# Patient Record
Sex: FEMALE | Race: WHITE | NOT HISPANIC OR LATINO | Employment: STUDENT | URBAN - METROPOLITAN AREA
[De-identification: names, ages, dates, MRNs, and addresses within clinical notes are randomized per-mention and may not be internally consistent; named-entity substitution may affect disease eponyms.]

---

## 2023-09-06 ENCOUNTER — TRANSFERRED RECORDS (OUTPATIENT)
Dept: HEALTH INFORMATION MANAGEMENT | Facility: CLINIC | Age: 28
End: 2023-09-06
Payer: COMMERCIAL

## 2023-10-04 ENCOUNTER — TRANSFERRED RECORDS (OUTPATIENT)
Dept: HEALTH INFORMATION MANAGEMENT | Facility: CLINIC | Age: 28
End: 2023-10-04
Payer: COMMERCIAL

## 2023-10-04 LAB
ALT SERPL-CCNC: 30 IU/L (ref 5–35)
AST SERPL-CCNC: 34 U/L (ref 5–34)
CREATININE (EXTERNAL): 0.58 MG/DL (ref 0.5–1.3)

## 2023-11-01 ENCOUNTER — OFFICE VISIT (OUTPATIENT)
Dept: OPHTHALMOLOGY | Facility: CLINIC | Age: 28
End: 2023-11-01
Attending: OPHTHALMOLOGY
Payer: COMMERCIAL

## 2023-11-01 DIAGNOSIS — H30.23 INTERMEDIATE UVEITIS OF BOTH EYES: Primary | ICD-10-CM

## 2023-11-01 DIAGNOSIS — H20.13 CHRONIC ANTERIOR UVEITIS OF BOTH EYES: ICD-10-CM

## 2023-11-01 DIAGNOSIS — Z86.69 HISTORY OF PAPILLEDEMA: ICD-10-CM

## 2023-11-01 DIAGNOSIS — H40.053 BILATERAL OCULAR HYPERTENSION: ICD-10-CM

## 2023-11-01 DIAGNOSIS — Z79.899 HIGH RISK MEDICATION USE: ICD-10-CM

## 2023-11-01 DIAGNOSIS — H35.352 CYSTOID MACULAR EDEMA OF LEFT EYE: ICD-10-CM

## 2023-11-01 PROCEDURE — 99207 OCT OPTIC NERVE RNFL SPECTRALIS OU (BOTH EYES): CPT | Mod: 26 | Performed by: OPHTHALMOLOGY

## 2023-11-01 PROCEDURE — 92133 CPTRZD OPH DX IMG PST SGM ON: CPT | Performed by: OPHTHALMOLOGY

## 2023-11-01 PROCEDURE — 99204 OFFICE O/P NEW MOD 45 MIN: CPT | Mod: GC | Performed by: OPHTHALMOLOGY

## 2023-11-01 PROCEDURE — 99214 OFFICE O/P EST MOD 30 MIN: CPT | Mod: 25 | Performed by: OPHTHALMOLOGY

## 2023-11-01 PROCEDURE — 92134 CPTRZ OPH DX IMG PST SGM RTA: CPT | Performed by: OPHTHALMOLOGY

## 2023-11-01 PROCEDURE — 92235 FLUORESCEIN ANGRPH MLTIFRAME: CPT | Performed by: OPHTHALMOLOGY

## 2023-11-01 RX ORDER — NORETHINDRONE ACETATE AND ETHINYL ESTRADIOL 1MG-20(21)
1 KIT ORAL DAILY
COMMUNITY
Start: 2023-08-24

## 2023-11-01 RX ORDER — PREDNISONE 2.5 MG/1
TABLET ORAL
Qty: 200 TABLET | Refills: 1 | Status: SHIPPED | OUTPATIENT
Start: 2023-11-01 | End: 2023-12-13

## 2023-11-01 RX ORDER — PREDNISOLONE ACETATE 10 MG/ML
1 SUSPENSION/ DROPS OPHTHALMIC
COMMUNITY
Start: 2023-08-24 | End: 2023-12-13

## 2023-11-01 RX ORDER — FOLIC ACID 1 MG/1
2 TABLET ORAL
COMMUNITY
Start: 2023-08-02

## 2023-11-01 ASSESSMENT — EXTERNAL EXAM - LEFT EYE: OS_EXAM: NORMAL

## 2023-11-01 ASSESSMENT — VISUAL ACUITY
OD_CC: 20/25
OD_CC+: -1
METHOD: SNELLEN - LINEAR
CORRECTION_TYPE: GLASSES
OS_CC: 20/25

## 2023-11-01 ASSESSMENT — CONF VISUAL FIELD
OS_SUPERIOR_TEMPORAL_RESTRICTION: 0
OS_NORMAL: 1
OS_INFERIOR_TEMPORAL_RESTRICTION: 0
OS_INFERIOR_NASAL_RESTRICTION: 0
OD_SUPERIOR_TEMPORAL_RESTRICTION: 0
METHOD: COUNTING FINGERS
OD_NORMAL: 1
OD_INFERIOR_TEMPORAL_RESTRICTION: 0
OD_INFERIOR_NASAL_RESTRICTION: 0
OS_SUPERIOR_NASAL_RESTRICTION: 0
OD_SUPERIOR_NASAL_RESTRICTION: 0

## 2023-11-01 ASSESSMENT — EXTERNAL EXAM - RIGHT EYE: OD_EXAM: NORMAL

## 2023-11-01 ASSESSMENT — CUP TO DISC RATIO
OD_RATIO: 0.2
OS_RATIO: 0.25

## 2023-11-01 ASSESSMENT — SLIT LAMP EXAM - LIDS
COMMENTS: NO LASH LOSS
COMMENTS: NO LASH LOSS

## 2023-11-01 ASSESSMENT — TONOMETRY
IOP_METHOD: TONOPEN
OD_IOP_MMHG: 18
OS_IOP_MMHG: 15

## 2023-11-01 NOTE — PROGRESS NOTES
Chief Complaint/Presenting Concern: Uveitis evaluation    History of Present Illness:   Glory Oswald is a 27 year old patient who presents to transfer care for her intermediate uveitis.  This was originally diagnosed around 2021 and treated with oral steroid, which is currently being tapered but still used.  Oral methotrexate was added in 2023 and Ms. Oswald is interested in assessing the current status and future plans for her treatment.    Ms. Oswald was previously seen by Dr. John at [facility] and more recently Dr. Russ Gonzalez.  A flare of the anterior uveitis occurred in August 2023 requiring a modification of oral prednisone (up to 15 mg daily) and the addition of steroid eyedrops to the right eye, which have since been tapered.  No steroid eye injections have been given.    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 Previously evaluated by Dr. Konstantin pennington in 2014. Sees Neurosurgery only as needed given long-term stability  History of ocular hypertension both eyes without the need for regular use of eye pressure lowering drops  Cystoid macular edema left eye    Relevant Past Medical/Family/Social History:  She follows with Dr. Doyle and has been on methotrexate since January 2023, currently 7 pills (17.5 mg weekly) since February 2023. As above, oral prednisone has been used since 2021 and has not been discontinued for very long since that time.   History of pilocytic astrocytoma as above, s/p resection  3. No history of diabetes or blood pressure issues.  4.  Uses oral contraceptives    She reports family history of diabetes, but denies family history of autoimmune diseases.     She lives in [place], [place] and works in [employer] at [employer].   Here with her .    Relevant Review of Systems:She denies rashes, cough, difficulty breathing. No  headaches. Rarely ringing in the ears.    Laboratory Testing  Abnormal: Borderline RF, Elevated CRP;  8/24/23- Elevated AST ALT  Normal/negative: DMARD panel, CBC, lyme, ACE, hepatitis, CCP, HLA B27, ESR,   2/2021- Quant TB, Treponema, ACE     Current eye related medications: Oral Methotrexate 17.5 mg (7 tablets) weekly (on Saturdays), Folic acid 1mg 2 tablets daily, Prednisone 10 mg 1 tablet daily since August 2023,, prednisolone acetate 1 drop 1x daily right eye (down from 2x daily on 10/25/23), Artificial Tears as needed each eye     Retina/Uveitis Imaging:  OCT Spectralis Macula November 1, 2023  right eye: Vitreous opacities, Normal contour with no fluid or disruption of IS/OS  left eye:Vit debris noted, Normal contour with no fluid or disruption of IS/OS    RNFL November 1, 2023  Right eye: Avg thickness 125 microns, mild thickening, no thinning  Left eye:  Avg thickness 116 microns, minimal thickening, no thinning    Optos FA November 1, 2023  right eye: Transit normal; small vessel leakage mostly nasal and staining superotemporal, minimal disc leakage, no foveal leakage  left eye:  Late disc leakage. There is small vessel leakage mostly inferiorly and staining temporal and faint staining in the macula and Inferotemporal    Assessment:    1. Intermediate uveitis of both eyes  There is moderate vitreous inflammation right eye and mild left eye, this corresponds to degree of inflammation seen on angiogram     2. Chronic anterior uveitis of both eyes  Active in the right eye today.Inactive in the left eye today.     3. Bilateral ocular hypertension  Pressures are normal in both eyes today    4. Cystoid macular edema of left eye  None by OCT in either eye    5. History of papilledema  No recurrence    6. High risk medication use  Methotrexate 17.5 mg each week and prednisone 10 mg daily    Plan/Recommendations:  Discussed findings with patient.  There is still active inflammation clinically in the right eye and  by angiography in both eyes.  There is no sight threatening cystoid macular edema in either eye.  We discussed the long-term use of oral prednisone and the current dose of methotrexate which is well-tolerated.  As below ,we recommend increasing the dose of methotrexate while we attempt a prednisone taper.  We will also modify steroid drop use in the right eye to help with the transition  Eye pressure is within acceptable range in both eyes today.  Optic nerves and scans are healthy, so no eye pressure lowering drops are needed  Recommend additional diagnostic testing: None at this time  For the methotrexate, if okay with Dr. Doyle, let's increase the dose to 8 pills (20 mg) starting this week and then continuing every week.  This would be a good interval increase given the inflammatory features.  If there is a need for additional inflammation control as prednisone is tapered, we may ask Dr. Doyle about the addition of Humira  Continue folic acid 2 mg daily  For the steroid drop, increase to 2x/day in the right eye and no steroid drops needed left eye  For the prednisone pills, continue taking 10 mg each AM until Sunday, 11/26/23. Starting on 11/27/23, reduce to 7.5 mg each AM until next visit    RTC Dec 11 week tonopen, AC check, dilate each eye, OCT macula (ordered)    Zackary Ventura MD MPH  Vitreoretinal Fellow PGY-6  [facility]     Attending Physician Attestation:  Complete documentation of historical and exam elements from today's encounter can be found in the full encounter summary report (not reduplicated in this progress note). I reviewed the chief complaint(s) and history of present illness, and  confirmed and edited as necessary the review of systems, past medical/surgical history, family history, social history, and examination findings as documented by others and the treating Resident or Fellow Physician.    I examined the patient myself, discussed the findings, reviewed all ancillary testing  data and modified these results and reports along with the assessment and plan with the Treating Resident or Fellow Physician. I agree with the note as detailed above.   Ricky Dior M.D.  Uveitis and Medical Retina  November 1, 2023

## 2023-11-01 NOTE — NURSING NOTE
Chief Complaints and History of Present Illnesses   Patient presents with    Uveitis Evaluation     Chief Complaint(s) and History of Present Illness(es)       Uveitis Evaluation              Laterality: both eyes    Onset: gradual    Onset: months ago    Quality: States the va is blurry     Severity: moderate    Frequency: intermittently    Associated symptoms: photophobia, flashes (rarely) and floaters    Treatments tried: eye drops    Pain scale: 0/10              Comments    Here for intermediate uveitis   States the last flare was Aug in each eye   Prednisolone every day right eye   Prednisone 10 mg  Methotrexate  Folic acid  Yadira Barber COT 2:06 PM November 1, 2023

## 2023-11-01 NOTE — PATIENT INSTRUCTIONS
For the methotrexate, if okay with Dr. Doyle, let's increase the dose to 8 pills (20 mg) starting this week and then continuing every week   Continue folic acid 2 mg daily  For the steroid drop, increase to 2x/day in the right eye and none needed left eye  For the prednisone pills, continue taking 10 mg each AM until Sunday, 11/26/23. Starting on 11/27/23, reduce to 7.5 mg each AM Until next visit

## 2023-11-01 NOTE — LETTER
11/1/2023       RE: Glory Oswald  [address] [id number]     Dear Colleague,    Thank you for referring your patient, Glory Oswald, to the [facility] [place] at [facility]. Please see a copy of my visit note below.    Chief Complaint/Presenting Concern: Uveitis evaluation.    History of Present Illness:   Glory Oswald is a 27 year old patient who presents to transfer care for her intermediate uveitis.  This was originally diagnosed around 2021 and treated with oral steroid, which is currently being tapered but still used.  Oral methotrexate was added in 2023 and Ms. Oswald is interested in assessing the current status and future plans for her treatment.    Ms. Oswald was previously seen by Dr. John at [facility] and more recently Dr. Russ Gonzalez.  A flare of the anterior uveitis occurred in August 2023 requiring a modification of oral prednisone (up to 15 mg daily) and the addition of steroid eyedrops to the right eye, which have since been tapered.  No steroid eye injections have been given.    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 Previously evaluated by Dr. Pryor last in 2014. Sees Neurosurgery only as needed given long-term stability.  History of ocular hypertension both eyes without the need for regular use of eye pressure lowering drops.  Cystoid macular edema left eye.    Relevant Past Medical/Family/Social History:  She follows with Dr. Doyle and has been on methotrexate since January 2023, currently 7 pills (17.5 mg weekly) since February 2023. As above, oral prednisone has been used since 2021 and has not been discontinued for very long since that time.   History of pilocytic astrocytoma as above, s/p resection.  3. No history of diabetes or blood  Please inform patient Fibroscan shows grade 3 fatty liver. She does not have fibrosis or cirrhosis which is reassuring. Recommend to continue with weight loss and exercise.  pressure issues.  4.  Uses oral contraceptives.    She reports family history of diabetes, but denies family history of autoimmune diseases.     She lives in [place], [place] and works in [employer] at [employer].   Here with her .    Relevant Review of Systems:She denies rashes, cough, difficulty breathing. No headaches. Rarely ringing in the ears.    Laboratory Testing  Abnormal: Borderline RF, Elevated CRP;  8/24/23- Elevated AST ALT  Normal/negative: DMARD panel, CBC, lyme, ACE, hepatitis, CCP, HLA B27, ESR,   2/2021- Quant TB, Treponema, ACE     Current eye related medications: Oral Methotrexate 17.5 mg (7 tablets) weekly (on Saturdays), Folic acid 1mg 2 tablets daily, Prednisone 10 mg 1 tablet daily since August 2023,, prednisolone acetate 1 drop 1x daily right eye (down from 2x daily on 10/25/23), Artificial Tears as needed each eye     Retina/Uveitis Imaging:  OCT Spectralis Macula November 1, 2023  right eye: Vitreous opacities, Normal contour with no fluid or disruption of IS/OS  left eye:Vit debris noted, Normal contour with no fluid or disruption of IS/OS    RNFL November 1, 2023  Right eye: Avg thickness 125 microns, mild thickening, no thinning  Left eye:  Avg thickness 116 microns, minimal thickening, no thinning    Optos FA November 1, 2023  right eye: Transit normal; small vessel leakage mostly nasal and staining superotemporal, minimal disc leakage, no foveal leakage  left eye:  Late disc leakage. There is small vessel leakage mostly inferiorly and staining temporal and faint staining in the macula and Inferotemporal    Assessment:    1. Intermediate uveitis of both eyes  There is moderate vitreous inflammation right eye and mild left eye, this corresponds to degree of inflammation seen on angiogram.     2. Chronic anterior uveitis of both eyes  Active in the right eye today.Inactive in the left eye today.     3. Bilateral ocular hypertension  Pressures are normal in both eyes  today.    4. Cystoid macular edema of left eye  None by OCT in either eye.    5. History of papilledema  No recurrence.    6. High risk medication use  Methotrexate 17.5 mg each week and prednisone 10 mg daily.    Plan/Recommendations:  Discussed findings with patient.  There is still active inflammation clinically in the right eye and by angiography in both eyes.  There is no sight threatening cystoid macular edema in either eye.  We discussed the long-term use of oral prednisone and the current dose of methotrexate which is well-tolerated.  As below ,we recommend increasing the dose of methotrexate while we attempt a prednisone taper.  We will also modify steroid drop use in the right eye to help with the transition.  Eye pressure is within acceptable range in both eyes today.  Optic nerves and scans are healthy, so no eye pressure lowering drops are needed.  Recommend additional diagnostic testing: None at this time.  For the methotrexate, if okay with Dr. Doyle, let's increase the dose to 8 pills (20 mg) starting this week and then continuing every week.  This would be a good interval increase given the inflammatory features.  If there is a need for additional inflammation control as prednisone is tapered, we may ask Dr. Doyle about the addition of Humira.  Continue folic acid 2 mg daily.  For the steroid drop, increase to 2x/day in the right eye and no steroid drops needed left eye.  For the prednisone pills, continue taking 10 mg each AM until Sunday, 11/26/23. Starting on 11/27/23, reduce to 7.5 mg each AM until next visit.    RTC Dec 11 week tonopen, AC check, dilate each eye, OCT macula (ordered)    Zackary Ventura MD MPH  Vitreoretinal Fellow PGY-6  [facility]     Attending Physician Attestation:  Complete documentation of historical and exam elements from today's encounter can be found in the full encounter summary report (not reduplicated in this progress note). I reviewed the chief  complaint(s) and history of present illness, and  confirmed and edited as necessary the review of systems, past medical/surgical history, family history, social history, and examination findings as documented by others and the treating Resident or Fellow Physician.    I examined the patient myself, discussed the findings, reviewed all ancillary testing data (if any completed) and modified these results and reports (if any interpreted) along with the assessment and plan with the Treating Resident or Fellow Physician. I agree with the note as detailed above.   Ricky Dior M.D.  Uveitis and Medical Retina  November 1, 2023      Again, thank you for allowing me to participate in the care of your patient.      Sincerely,    Ricky Dior MD  [facility] Dept of Ophthalmology  Uveitis and Medical Retina

## 2023-12-13 ENCOUNTER — OFFICE VISIT (OUTPATIENT)
Dept: OPHTHALMOLOGY | Facility: CLINIC | Age: 28
End: 2023-12-13
Attending: OPHTHALMOLOGY
Payer: COMMERCIAL

## 2023-12-13 DIAGNOSIS — H40.053 BILATERAL OCULAR HYPERTENSION: ICD-10-CM

## 2023-12-13 DIAGNOSIS — Z79.899 HIGH RISK MEDICATION USE: ICD-10-CM

## 2023-12-13 DIAGNOSIS — H30.23 INTERMEDIATE UVEITIS OF BOTH EYES: Primary | ICD-10-CM

## 2023-12-13 DIAGNOSIS — H35.352 CYSTOID MACULAR EDEMA OF LEFT EYE: ICD-10-CM

## 2023-12-13 DIAGNOSIS — H20.13 CHRONIC ANTERIOR UVEITIS OF BOTH EYES: ICD-10-CM

## 2023-12-13 DIAGNOSIS — Z86.69 HISTORY OF PAPILLEDEMA: ICD-10-CM

## 2023-12-13 PROCEDURE — 99214 OFFICE O/P EST MOD 30 MIN: CPT | Mod: GC | Performed by: OPHTHALMOLOGY

## 2023-12-13 PROCEDURE — 92134 CPTRZ OPH DX IMG PST SGM RTA: CPT | Performed by: OPHTHALMOLOGY

## 2023-12-13 PROCEDURE — 99214 OFFICE O/P EST MOD 30 MIN: CPT | Performed by: OPHTHALMOLOGY

## 2023-12-13 RX ORDER — PREDNISOLONE ACETATE 10 MG/ML
1 SUSPENSION/ DROPS OPHTHALMIC 2 TIMES DAILY
Qty: 5 ML | Refills: 4 | Status: SHIPPED | OUTPATIENT
Start: 2023-12-13 | End: 2024-03-04

## 2023-12-13 RX ORDER — PREDNISONE 2.5 MG/1
TABLET ORAL
Qty: 100 TABLET | Refills: 1 | Status: SHIPPED | OUTPATIENT
Start: 2023-12-13 | End: 2024-02-23

## 2023-12-13 ASSESSMENT — VISUAL ACUITY
METHOD: SNELLEN - LINEAR
OD_SC: 20/25
OS_SC: 20/25
OS_SC+: +2

## 2023-12-13 ASSESSMENT — CUP TO DISC RATIO
OD_RATIO: 0.2
OS_RATIO: 0.25

## 2023-12-13 ASSESSMENT — TONOMETRY
OD_IOP_MMHG: 19
OS_IOP_MMHG: 16
IOP_METHOD: TONOPEN

## 2023-12-13 ASSESSMENT — CONF VISUAL FIELD
METHOD: COUNTING FINGERS
OS_SUPERIOR_TEMPORAL_RESTRICTION: 0
OD_SUPERIOR_NASAL_RESTRICTION: 0
OS_SUPERIOR_NASAL_RESTRICTION: 0
OS_INFERIOR_NASAL_RESTRICTION: 0
OD_SUPERIOR_TEMPORAL_RESTRICTION: 0
OD_INFERIOR_TEMPORAL_RESTRICTION: 0
OD_INFERIOR_NASAL_RESTRICTION: 0
OS_INFERIOR_TEMPORAL_RESTRICTION: 0
OS_NORMAL: 1
OD_NORMAL: 1

## 2023-12-13 ASSESSMENT — EXTERNAL EXAM - LEFT EYE: OS_EXAM: NORMAL

## 2023-12-13 ASSESSMENT — EXTERNAL EXAM - RIGHT EYE: OD_EXAM: NORMAL

## 2023-12-13 NOTE — PROGRESS NOTES
"Chief Complaint/Presenting Concern:  Uveitis follow up    Interval History of Present Ocular Illness:  Glory Oswald is a 27 year old patient who returns for follow up of her intermediate uveitis. We last met each other on 11/01/2023 at which time we decided to increase methotrexate to 8 pills (20 mg) and prednisolone to 2x daily. and reduce prednisone to 7.5 mg daily.     Ms. Oswald reports that she has had some \"brain fog\" after increasing her dose of methotrexate. She continues to experience photophobia and blurry vision each eye but right eye > left eye. She was able to increase methotrexate and lower prednisone.    Interval Updates to Medical/Family/Social History:   No health changes. Still having prednisone side effects such as trouble sleeping and going the bathroom frequently    Relevant Review of Systems Updates:   Some \"brain fog\" and fatigue/nausea on day of and day after methotrexate  Has experienced headaches randomly, some ringing in her ears in the left ear. Has blue filter glasses that help. No rashes, no joint pain, no fever or chills.    Laboratory Testing: No new labs     Current eye related medications: Oral Methotrexate 20.0 mg (8 tablets) weekly (on Saturdays), Folic acid 1mg 2 tablets daily, Prednisone 7.5 mg daily since November 2023, prednisolone acetate 1 drop 2x daily right eye (11/01/23), Artificial Tears as needed each eye      Retina/Uveitis Imaging:   OCT Spectralis Macula December 13, 2023  right eye: Decrease in vitreous opacities.Normal foveal contour, no fluid  left eye: Normal foveal contour, trace ERM.no fluid    Assessment:     1. Intermediate uveitis of both eyes  Right eye:Persistent central mobile haze, no worse    Left eye: Trace haze but stable    2. Chronic anterior uveitis of both eyes  Right eye: Still active    Left eye: No inflammation    3. Bilateral ocular hypertension  IOP within normal limits today    4. Cystoid macular edema of left eye  No edema on OCT " today    5. History of papilledema  No signs on papilledema on exam today    6. High risk medication use  Methotrexate 20 mg each week and prednisone 7.5 mg daily     Plan/Recommendations:    Discussed findings with patient and her . There is still active inflammation of the right eye but there is no macular edema in either eye. This is encouraging as we are on more methotrexate and lower prednisone. WE discussed other options which are likely to help and have noted below. Given it has been 3 years on prednisone, we would like to move towards getting down on this dose as we ask Dr. Doyle about other options  Eye pressure is 19/16. This can be monitored  We discussed steroid injections for the eyes which work well for inflammation but can raise eye pressure and lead to cataracts. We elected to consider this for later if necessary, reasonable to hold off for now as no macular edema  Recommend additional testing: No additional testing at this time   Continue Oral Methotrexate 20 mg (8 tablets) weekly (on Saturdays) without dose adjustment at this time  You can try to take Folic acid 2 mg daily. You can try one extra dose (3 mg total) on methotrexate day and day after to see if his helps  Continue Prednisone 7.5 mg daily  For the steroid drop, continue 2xday right eye only  We will ask Dr. Doyle about adding Humira. There is no urgency, but this is likely to help inflammation as we try to taper prednisone.     RTC:    Dr. Doyle in a few weeks in January 2.  Barby mid-late January tonopen, AC check, then dilate each eye, and OCT (ordered)    Mark Foster MD  PGY-5 Vitreo-retina surgery Fellow  Department of Ophthalmology   [facility]    Attending Physician Attestation:  Complete documentation of historical and exam elements from today's encounter can be found in the full encounter summary report (not reduplicated in this progress note). I reviewed the chief complaint(s) and history of  present illness, and  confirmed and edited as necessary the review of systems, past medical/surgical history, family history, social history, and examination findings as documented by others and the treating Resident or Fellow Physician.    I examined the patient myself, discussed the findings, reviewed all ancillary testing data and modified these results and reports along with the assessment and plan with the Treating Resident or Fellow Physician. I agree with the note as detailed above.   Ricky Dior M.D.  Uveitis and Medical Retina  December 13, 2023

## 2023-12-13 NOTE — LETTER
"12/13/2023       RE: Glory Oswald  [address] [address]     Dear Colleague,    Thank you for referring your patient, Glory Oswald, to the [facility] [place] at [facility]. Please see a copy of my visit note below.    Chief Complaint/Presenting Concern:  Uveitis follow up.    Interval History of Present Ocular Illness:  Glory Oswald is a 27 year old patient who returns for follow up of her intermediate uveitis. We last met each other on 11/01/2023 at which time we decided to increase methotrexate to 8 pills (20 mg) and prednisolone to 2x daily. and reduce prednisone to 7.5 mg daily.     Ms. Oswald reports that she has had some \"brain fog\" after increasing her dose of methotrexate. She continues to experience photophobia and blurry vision each eye but right eye > left eye. She was able to increase methotrexate and lower prednisone.    Interval Updates to Medical/Family/Social History:   No health changes. Still having prednisone side effects such as trouble sleeping and going the bathroom frequently.    Relevant Review of Systems Updates:   Some \"brain fog\" and fatigue/nausea on day of and day after methotrexate  Has experienced headaches randomly, some ringing in her ears in the left ear. Has blue filter glasses that help. No rashes, no joint pain, no fever or chills.    Laboratory Testing: No new labs     Current eye related medications: Oral Methotrexate 20.0 mg (8 tablets) weekly (on Saturdays), Folic acid 1mg 2 tablets daily, Prednisone 7.5 mg daily since November 2023, prednisolone acetate 1 drop 2x daily right eye (11/01/23), Artificial Tears as needed each eye      Retina/Uveitis Imaging:   OCT Spectralis Macula December 13, 2023  right eye: Decrease in vitreous opacities.Normal foveal contour, no fluid  left eye: Normal foveal contour, trace ERM.no fluid    Assessment:     1. Intermediate uveitis of both " eyes  Right eye:Persistent central mobile haze, no worse.    Left eye: Trace haze but stable.    2. Chronic anterior uveitis of both eyes  Right eye: Still active.    Left eye: No inflammation.    3. Bilateral ocular hypertension  IOP within normal limits today.    4. Cystoid macular edema of left eye  No edema on OCT today.    5. History of papilledema  No signs on papilledema on exam today.    6. High risk medication use  Methotrexate 20 mg each week and prednisone 7.5 mg daily.     Plan/Recommendations:    Discussed findings with patient and her . There is still active inflammation of the right eye but there is no macular edema in either eye. This is encouraging as we are on more methotrexate and lower prednisone. WE discussed other options which are likely to help and have noted below. Given it has been 3 years on prednisone, we would like to move towards getting down on this dose as we ask Dr. Doyle about other options.  Eye pressure is 19/16. This can be monitored.  We discussed steroid injections for the eyes which work well for inflammation but can raise eye pressure and lead to cataracts. We elected to consider this for later if necessary, reasonable to hold off for now as no macular edema.  Recommend additional testing: No additional testing at this time.   Continue Oral Methotrexate 20 mg (8 tablets) weekly (on Saturdays) without dose adjustment at this time.  You can try to take Folic acid 2 mg daily. You can try one extra dose (3 mg total) on methotrexate day and day after to see if his helps.  Continue Prednisone 7.5 mg daily.  For the steroid drop, continue 2xday right eye only.  We will ask Dr. Doyle about adding Humira. There is no urgency, but this is likely to help inflammation as we try to taper prednisone.     RTC:    Dr. Doyle in a few weeks in January  2.  Barby mid-late January tonopen, AC check, then dilate right eye, and OCT (ordered)    Mark Foster MD  PGY-5 Vitreo-retina  surgery Fellow  Department of Ophthalmology   [facility]    Attending Physician Attestation:  Complete documentation of historical and exam elements from today's encounter can be found in the full encounter summary report (not reduplicated in this progress note). I reviewed the chief complaint(s) and history of present illness, and  confirmed and edited as necessary the review of systems, past medical/surgical history, family history, social history, and examination findings as documented by others and the treating Resident or Fellow Physician.    I examined the patient myself, discussed the findings, reviewed all ancillary testing data and modified these results and reports along with the assessment and plan with the Treating Resident or Fellow Physician. I agree with the note as detailed above.   Ricky Dior M.D.  Uveitis and Medical Retina  December 13, 2023      Again, thank you for allowing me to participate in the care of your patient.      Sincerely,    Ricky Dior MD  [facility] Dept of Ophthalmology  Uveitis and Medical Retina

## 2023-12-13 NOTE — PATIENT INSTRUCTIONS
Continue Oral Methotrexate 20 mg (8 tablets) weekly (on Saturdays) without dose adjustment at this time  You can try to take Folic acid 2 mg daily. You can try one extra dose (3 mg total) on methotrexate day and day after to see if his helps  Continue Prednisone 7.5 mg daily  For the steroid drop, continue 2xday right eye only  We will ask Dr. Doyle about adding Humira. There is no urgency, but this is likely to help inflammation as we try to taper prednisone.

## 2023-12-13 NOTE — NURSING NOTE
Chief Complaints and History of Present Illnesses   Patient presents with    Uveitis Follow-Up     6 week follow up      Chief Complaint(s) and History of Present Illness(es)       Uveitis Follow-Up              Comments: 6 week follow up               Comments    Pt states vision is the same as last visit, very blurry in RE. Dryness and soreness in each eye, relief with artificial tears.    BREANNE Oneal December 13, 2023 7:16 AM

## 2024-01-04 ENCOUNTER — TRANSFERRED RECORDS (OUTPATIENT)
Dept: HEALTH INFORMATION MANAGEMENT | Facility: CLINIC | Age: 29
End: 2024-01-04
Payer: COMMERCIAL

## 2024-01-04 LAB
ALT SERPL-CCNC: 22 IU/L (ref 5–35)
AST SERPL-CCNC: 34 U/L (ref 5–34)
CREATININE (EXTERNAL): 0.58 MG/DL (ref 0.5–1.3)

## 2024-01-19 ENCOUNTER — TELEPHONE (OUTPATIENT)
Dept: OPHTHALMOLOGY | Facility: CLINIC | Age: 29
End: 2024-01-19
Payer: COMMERCIAL

## 2024-01-19 NOTE — TELEPHONE ENCOUNTER
[facility] Call Center    Phone Message    May a detailed message be left on voicemail: Yes     Reason for Call: Symptoms or Concerns     If patient has red-flag symptoms, warm transfer to triage line    Current symptom or concern: Left eye sensitivity and redness.  Patient asking if she can start eye drops on that eye prior to appointment on Wednesday 1/24.     Symptoms have been present for:  2 day(s)    Has patient previously been seen for this? Yes    By Barby: 12/13/2023      Are there any new or worsening symptoms? Yes: Left eye flare up.     Action Taken: Message routed to:  [facility] ([facility] Eye     Travel Screening: Not Applicable

## 2024-01-24 ENCOUNTER — OFFICE VISIT (OUTPATIENT)
Dept: OPHTHALMOLOGY | Facility: CLINIC | Age: 29
End: 2024-01-24
Attending: OPHTHALMOLOGY
Payer: COMMERCIAL

## 2024-01-24 DIAGNOSIS — H20.13 CHRONIC ANTERIOR UVEITIS OF BOTH EYES: Primary | ICD-10-CM

## 2024-01-24 DIAGNOSIS — H40.053 BILATERAL OCULAR HYPERTENSION: ICD-10-CM

## 2024-01-24 DIAGNOSIS — H30.23 INTERMEDIATE UVEITIS OF BOTH EYES: ICD-10-CM

## 2024-01-24 DIAGNOSIS — H35.352 CYSTOID MACULAR EDEMA OF LEFT EYE: ICD-10-CM

## 2024-01-24 DIAGNOSIS — Z79.899 HIGH RISK MEDICATION USE: ICD-10-CM

## 2024-01-24 PROCEDURE — 92134 CPTRZ OPH DX IMG PST SGM RTA: CPT | Performed by: OPHTHALMOLOGY

## 2024-01-24 PROCEDURE — 99213 OFFICE O/P EST LOW 20 MIN: CPT | Performed by: OPHTHALMOLOGY

## 2024-01-24 PROCEDURE — 99214 OFFICE O/P EST MOD 30 MIN: CPT | Mod: GC | Performed by: OPHTHALMOLOGY

## 2024-01-24 RX ORDER — TIMOLOL MALEATE 5 MG/ML
1 SOLUTION/ DROPS OPHTHALMIC EVERY MORNING
Qty: 10 ML | Refills: 5 | Status: SHIPPED | OUTPATIENT
Start: 2024-01-24 | End: 2024-05-28

## 2024-01-24 ASSESSMENT — SLIT LAMP EXAM - LIDS
COMMENTS: NORMAL
COMMENTS: NORMAL

## 2024-01-24 ASSESSMENT — CONF VISUAL FIELD
OD_SUPERIOR_TEMPORAL_RESTRICTION: 0
OD_INFERIOR_TEMPORAL_RESTRICTION: 0
OS_SUPERIOR_TEMPORAL_RESTRICTION: 0
OD_INFERIOR_NASAL_RESTRICTION: 0
METHOD: COUNTING FINGERS
OD_SUPERIOR_NASAL_RESTRICTION: 0
OD_NORMAL: 1
OS_INFERIOR_NASAL_RESTRICTION: 0
OS_INFERIOR_TEMPORAL_RESTRICTION: 0
OS_SUPERIOR_NASAL_RESTRICTION: 0
OS_NORMAL: 1

## 2024-01-24 ASSESSMENT — TONOMETRY
OS_IOP_MMHG: 18
OD_IOP_MMHG: 24
IOP_METHOD: TONOPEN
IOP_METHOD: APPLANATION
OD_IOP_MMHG: 24

## 2024-01-24 ASSESSMENT — CUP TO DISC RATIO
OD_RATIO: 0.2
OS_RATIO: 0.25

## 2024-01-24 ASSESSMENT — EXTERNAL EXAM - RIGHT EYE: OD_EXAM: NORMAL

## 2024-01-24 ASSESSMENT — EXTERNAL EXAM - LEFT EYE: OS_EXAM: NORMAL

## 2024-01-24 ASSESSMENT — VISUAL ACUITY
OS_SC: 20/30
METHOD: SNELLEN - LINEAR
OD_SC: 20/20
OD_SC+: -2

## 2024-01-24 NOTE — PROGRESS NOTES
Chief Complaint/Presenting Concern: Uveitis follow-up    Interval History of Present Ocular Illness:  Glory Oswald is a 28 year old patient who returns for follow up of her intermediate uveitis of both eyes.  We last saw each other on December 13, 2023 after the dose of methotrexate has been increased to 20 mg weekly.  Inflammation in the right eye was still active we considered more time on methotrexate 20 mg weekly and prednisone 7.5 mg daily while in rheumatology to consider other Humira.    A few days ago Glory messaged about some symptoms in her left eye and we discussed starting drops in the eye. Around this time, Glory also had some light sensitivity right eye. This seemed to get worse after COVID booster. She used the drops 4x/day in each eye and things have improved. Floaters still there right eye and no major change left eye with floaters but still not quite right.     Interval Updates to Medical/Family/Social History:    Dr. Doyle felt the dose of methotrexate was appropriate but is open to the idea of adding Humira if we feel necessary  Glory got a COVID booster last week    Relevant Review of Systems Updates:  No other issues from vaccine. Otherwise feeing well.    Laboratory Testing - 1/4/24 with Rheumatology-scanned in  Abnormal: CRP 3.82 (H), ESR 30 (H)  Normal/negative: AST, ALT, Cr, GFR, CBC     Current eye related medications: Prednisone 7.5 mg daily, Oral methotrexate 20 mg (8 tablets weekly, folic acid 2 mg daily and 3 mg day of and day after methotrexate, prednisone 4x/day BOTH EYES    Retina/Uveitis Imaging:   OCT Spectralis Macula January 24, 2024  right eye: mild increase in vitreous opacitiesnormal foveal contour, no fluid. No NFL Thickening  left eye: No vitreous opacities, Normal foveal contour with trace ERM, no IRF/SRF. Mild NFL thickening    Assessment:     1. Chronic anterior uveitis of both eyes  Slight increase right eye and definite increase left eye     2.  Intermediate uveitis of both eyes  Right eye: Still some central haze, mild increase in vitreous opacities on OCT    Left eye: Stable trace haze    3. Bilateral ocular hypertension  IOP 24/18 today.    4. Cystoid macular edema of left eye  No macular edema on OCT today    5. High risk medication use  Methotrexate 20 mg each week and prednisone 7.5 mg daily     Plan/Recommendations:    Discussed findings with patient. There is slightly increased inflammation in both eyes today compared to her last visit, more notably in the left eye. Glory was able to increase her prednisolone drops to 4x daily in both eyes which has helped her symptoms. We discussed modifications of prednisone vs steroid injection in the short term as well as long term options. We will modify oral prednisone for now and ask Dr. Doyle about adding Humira   Eye pressure is 24/18. IOP slightly elevated in the right eye today, this is likely a steroid response. We can continue to monitor but will watch closely.   Continue Oral Methotrexate 20 mg (8 tablets) weekly (on Saturdays). Given inflammation is still active (and the left eye has a flare as well), we would kindly ask Dr. Doyle about adding Humira 40 mg every 2 weeks as this is is likely to help and may ultimately help reduce methotrexate dose  Continue  Folic acid 2 mg daily and 3 mg on methotrexate day and day after.  For the prednisone, let's up to to 10 mg daily until next visit.   For the steroid drop (Prednisolone): Continue 4x/day both eyes for 10 more days (Until Feb 3). Then starting Feb 4, use 3x/day in each eye for two weeks (until Feb 18). Then starting Feb 19, reduce to 2x/day until next visit  Add Timolol each morning in each eye for eye pressure lowering    RTC 1st week March tonopen, OCT, then AC check and dilate    Kennedi West MD  Resident Physician, PGY-3  Department of Ophthalmology    Attending Physician Attestation:  Complete documentation of historical and exam elements  from today's encounter can be found in the full encounter summary report (not reduplicated in this progress note). I reviewed the chief complaint(s) and history of present illness, and  confirmed and edited as necessary the review of systems, past medical/surgical history, family history, social history, and examination findings as documented by others and the treating Resident or Fellow Physician.    I examined the patient myself, discussed the findings, reviewed all ancillary testing data and modified these results and reports along with the assessment and plan with the Treating Resident or Fellow Physician. I agree with the note as detailed above.   Ricky Dior M.D.  Uveitis and Medical Retina  January 24, 2024

## 2024-01-24 NOTE — LETTER
1/24/2024       RE: Glory Oswald  [address] [address]     Dear Colleague,    Thank you for referring your patient, Glory Oswald, to the [facility] [place] at [facility]. Please see a copy of my visit note below.    Chief Complaint/Presenting Concern: Uveitis follow-up    Interval History of Present Ocular Illness:  Glory Oswald is a 28 year old patient who returns for follow up of her intermediate uveitis of both eyes.  We last saw each other on December 13, 2023 after the dose of methotrexate has been increased to 20 mg weekly.  Inflammation in the right eye was still active we considered more time on methotrexate 20 mg weekly and prednisone 7.5 mg daily while in rheumatology to consider other Humira.    A few days ago Glory messaged about some symptoms in her left eye and we discussed starting drops in the eye. Around this time, Glory also had some light sensitivity right eye. This seemed to get worse after COVID booster. She used the drops 4x/day in each eye and things have improved. Floaters still there right eye and no major change left eye with floaters but still not quite right.     Interval Updates to Medical/Family/Social History:    Dr. Doyle felt the dose of methotrexate was appropriate but is open to the idea of adding Humira if we feel necessary  Glory got a COVID booster last week    Relevant Review of Systems Updates:  No other issues from vaccine. Otherwise feeing well.    Laboratory Testing - 1/4/24 with Rheumatology-scanned in  Abnormal: CRP 3.82 (H), ESR 30 (H)  Normal/negative: AST, ALT, Cr, GFR, CBC     Current eye related medications: Prednisone 7.5 mg daily, Oral methotrexate 20 mg (8 tablets weekly, folic acid 2 mg daily and 3 mg day of and day after methotrexate, prednisone 4x/day BOTH EYES    Retina/Uveitis Imaging:   OCT Spectralis Macula January 24, 2024  right eye: mild increase  in vitreous opacitiesnormal foveal contour, no fluid. No NFL Thickening  left eye: No vitreous opacities, Normal foveal contour with trace ERM, no IRF/SRF. Mild NFL thickening    Assessment:     1. Chronic anterior uveitis of both eyes  Slight increase right eye and definite increase left eye     2. Intermediate uveitis of both eyes  Right eye: Still some central haze, mild increase in vitreous opacities on OCT    Left eye: Stable trace haze    3. Bilateral ocular hypertension  IOP 24/18 today.    4. Cystoid macular edema of left eye  No macular edema on OCT today    5. High risk medication use  Methotrexate 20 mg each week and prednisone 7.5 mg daily     Plan/Recommendations:    Discussed findings with patient. There is slightly increased inflammation in both eyes today compared to her last visit, more notably in the left eye. Glory was able to increase her prednisolone drops to 4x daily in both eyes which has helped her symptoms. We discussed modifications of prednisone vs steroid injection in the short term as well as long term options. We will modify oral prednisone for now and ask Dr. Doyle about adding Humira   Eye pressure is 24/18. IOP slightly elevated in the right eye today, this is likely a steroid response. We can continue to monitor but will watch closely.   Continue Oral Methotrexate 20 mg (8 tablets) weekly (on Saturdays). Given inflammation is still active (and the left eye has a flare as well), we would kindly ask Dr. Doyle about adding Humira 40 mg every 2 weeks as this is is likely to help and may ultimately help reduce methotrexate dose  Continue  Folic acid 2 mg daily and 3 mg on methotrexate day and day after.  For the prednisone, let's up to to 10 mg daily until next visit.   For the steroid drop (Prednisolone): Continue 4x/day both eyes for 10 more days (Until Feb 3). Then starting Feb 4, use 3x/day in each eye for two weeks (until Feb 18). Then starting Feb 19, reduce to 2x/day until next  visit  Add Timolol each morning in each eye for eye pressure lowering    RTC 1st week March tonopen, OCT, then AC check and dilate    Kennedi West MD  Resident Physician, PGY-3  Department of Ophthalmology          Attending Physician Attestation:  Complete documentation of historical and exam elements from today's encounter can be found in the full encounter summary report (not reduplicated in this progress note). I personally obtained the chief complaint(s) and history of present illness. I confirmed and edited as necessary the review of systems, past medical/surgical history, family history, social history, and examination findings as documented by others; and I examined the patient myself. I personally reviewed the relevant tests, images, and reports as documented above. I formulated and edited as necessary the assessment and plan and discussed the findings and management plan with the patient and family.  Ricky Dior MD.      Again, thank you for allowing me to participate in the care of your patient.      Sincerely,    Ricky Dior MD  [facility] Dept of Ophthalmology  Uveitis and Medical Retina

## 2024-01-24 NOTE — NURSING NOTE
Chief Complaints and History of Present Illnesses   Patient presents with    Uveitis Follow-Up     Chief Complaint(s) and History of Present Illness(es)       Uveitis Follow-Up              Laterality: both eyes    Onset: gradual    Onset: months ago    Quality: States va in left eye has decreased since the flare on Thrus      Severity: moderate    Frequency: intermittently    Associated symptoms: photophobia, flashes (comes in waves) and floaters    Treatments tried: eye drops    Pain scale: 0/10              Comments    Here for Intermediate uveitis of both eyes  States last Thursday the left eye had a flare up.  The flare increased after COVID booster  Prednisolone QID left eye and 2-3x right eye   Methotrexate  Folic acid  Prednisone 7.5 mg  Yadira Barber COT 7:09 AM January 24, 2024

## 2024-01-24 NOTE — LETTER
January 24, 2024      Dear Dr. Doyle:    We are sending the note from today and would be grateful to you if Glory Oswald could get Humira added to her methotrexate and prednisone regimen.      Ricky Dior M.D.  Uveitis and Medical Retina  [facility] Department of Ophthalmology and Visual Neurosciences

## 2024-01-24 NOTE — PATIENT INSTRUCTIONS
Continue Oral Methotrexate 20 mg (8 tablets) weekly (on Saturdays). Given inflammation is still active (and the left eye has a flare as well), we would kindly ask Dr. Doyle about adding Humira 40 mg every 2 weeks as this is is likely to help and may ultimately help reduce methotrexate dose  Continue  Folic acid 2 mg daily and 3 mg on methotrexate day and day after.  For the prednisone, let's up to to 10 mg daily until next visit.   For the steroid drop (Prednisolone): Continue 4x/day both eyes for 10 more days (Until Feb 3). Then starting Feb 4, use 3x/day in each eye for two weeks (until Feb 18). Then starting Feb 19, reduce to 2x/day until next visit  Add Timolol each morning in each eye for eye pressure lowering

## 2024-02-23 DIAGNOSIS — H30.23 INTERMEDIATE UVEITIS OF BOTH EYES: ICD-10-CM

## 2024-02-23 NOTE — TELEPHONE ENCOUNTER
predniSONE (DELTASONE) 2.5 MG tablet   Last Written Prescription Date:  12/13/2023  Last Fill Quantity: 100,   # refills: 1  Last Office Visit : 1/24/2024  Future Office visit:  3/4/2024    Routing refill request to provider for review/approval because:  Drug not on the [facility], [facility] or [facility] refill protocol or controlled substance    Erin Savage RN  Central Triage Red Flags/Med Refills

## 2024-02-28 RX ORDER — PREDNISONE 2.5 MG/1
TABLET ORAL
Qty: 100 TABLET | Refills: 0 | Status: SHIPPED | OUTPATIENT
Start: 2024-02-28 | End: 2024-09-09

## 2024-03-04 ENCOUNTER — OFFICE VISIT (OUTPATIENT)
Dept: OPHTHALMOLOGY | Facility: CLINIC | Age: 29
End: 2024-03-04
Attending: OPHTHALMOLOGY
Payer: COMMERCIAL

## 2024-03-04 DIAGNOSIS — H40.053 BILATERAL OCULAR HYPERTENSION: ICD-10-CM

## 2024-03-04 DIAGNOSIS — H20.13 CHRONIC ANTERIOR UVEITIS OF BOTH EYES: Primary | ICD-10-CM

## 2024-03-04 DIAGNOSIS — H30.23 INTERMEDIATE UVEITIS OF BOTH EYES: ICD-10-CM

## 2024-03-04 DIAGNOSIS — Z79.899 HIGH RISK MEDICATION USE: ICD-10-CM

## 2024-03-04 DIAGNOSIS — H35.352 CYSTOID MACULAR EDEMA OF LEFT EYE: ICD-10-CM

## 2024-03-04 PROCEDURE — 92134 CPTRZ OPH DX IMG PST SGM RTA: CPT | Performed by: OPHTHALMOLOGY

## 2024-03-04 PROCEDURE — 99214 OFFICE O/P EST MOD 30 MIN: CPT | Performed by: OPHTHALMOLOGY

## 2024-03-04 PROCEDURE — 99211 OFF/OP EST MAY X REQ PHY/QHP: CPT | Mod: 25 | Performed by: OPHTHALMOLOGY

## 2024-03-04 RX ORDER — PREDNISOLONE ACETATE 10 MG/ML
1 SUSPENSION/ DROPS OPHTHALMIC 3 TIMES DAILY
Qty: 15 ML | Refills: 4 | Status: SHIPPED | OUTPATIENT
Start: 2024-03-04 | End: 2024-04-21

## 2024-03-04 ASSESSMENT — VISUAL ACUITY
OS_PH_SC+: -2
OD_SC+: -2
OS_SC+: -1
OS_SC: 20/40
METHOD: SNELLEN - LINEAR
OD_SC: 20/20
OS_PH_SC: 20/20

## 2024-03-04 ASSESSMENT — CUP TO DISC RATIO
OD_RATIO: 0.2
OS_RATIO: 0.25

## 2024-03-04 ASSESSMENT — CONF VISUAL FIELD
OD_SUPERIOR_TEMPORAL_RESTRICTION: 0
OD_NORMAL: 1
OD_INFERIOR_TEMPORAL_RESTRICTION: 0
OS_SUPERIOR_NASAL_RESTRICTION: 0
OD_SUPERIOR_NASAL_RESTRICTION: 0
METHOD: COUNTING FINGERS
OS_INFERIOR_TEMPORAL_RESTRICTION: 0
OS_SUPERIOR_TEMPORAL_RESTRICTION: 0
OS_NORMAL: 1
OD_INFERIOR_NASAL_RESTRICTION: 0
OS_INFERIOR_NASAL_RESTRICTION: 0

## 2024-03-04 ASSESSMENT — TONOMETRY
OD_IOP_MMHG: 20
IOP_METHOD: TONOPEN
OS_IOP_MMHG: 17

## 2024-03-04 ASSESSMENT — EXTERNAL EXAM - LEFT EYE: OS_EXAM: NORMAL

## 2024-03-04 ASSESSMENT — SLIT LAMP EXAM - LIDS
COMMENTS: NORMAL
COMMENTS: NORMAL

## 2024-03-04 ASSESSMENT — EXTERNAL EXAM - RIGHT EYE: OD_EXAM: NORMAL

## 2024-03-04 NOTE — LETTER
3/4/2024       RE: Glory Oswald  [address] [address]     Dear Colleague,    Thank you for referring your patient, Glory Oswald, to the [facility] [place] at [facility]. Please see a copy of my visit note below.    Chief Complaint/Presenting Concern:  Uveitis follow up    Interval History of Present Ocular Illness:  Glory Oswald is a 28 year old patient who returns for follow up of her chronic anterior and intermediate uveitis. Last visit, we were awaiting starting Humira and modified drops and prednisone pills     Glory reports things have been about the same. There was a day or two last week when the eyes had some pressure sensation on the top and perhaps behind the eyes. This does not feel like a true flare but just feels different.     Interval Updates to Medical/Family/Social History:  Still waiting Humira    Relevant Review of Systems Updates: No changes. Some grogginess but not too bad on more Methotrexate    Labs: None recently     Current eye related medications:  Prednisone 10 mg daily, Oral Methotrexate 20 mg (8 tablets) weekly, folic acid 2 mg daily and 3 mg day of and day after Methotrexate, Prednisolone (steroid drop) 2x/day each eye, Timolol in the morning in each eye     Retina/Uveitis Imaging:   OCT Spectralis Macula March 4, 2024  right eye: More prominent vitreous opacities, but normal foveal contour, no fluid  left eye: Persistent opacity in vitreous seen on infrared image, normal foveal contour,no fluid      Assessment:     1. Chronic anterior uveitis of both eyes  Improving in each eye but still active    2. Intermediate uveitis of both eyes  Persistent haze    3. Cystoid macular edema of left eye  None in each eye     4. Bilateral ocular hypertension  Improved today on Timolol     5. High risk medication use  Prednisone 10 mg daily,     Plan/Recommendations:    Discussed findings with patient  and her . The inflammation in the front of each eye is improved and there are still floaters and haziness in the back. We will keep Prednisone and Methotrexate unchanged, modify drops, and await starting Humira   Eye pressure is normal in each eye today, we should continue Timolol in the morning in each eye   Recommend additional testing: None needed  Continue these medications unchanged: Prednisone 10 mg daily, Oral Methotrexate 20 mg (8 tablets) weekly, folic acid 2 mg daily and 3 mg day of and day after methotrexate  For the Prednisolone (steroid drop), let's increase back to 3x/day until next visit  Continue Timolol in the morning in each eye   Okay to start Humira 40 mg every 2 weeks. We will send a My chart If Humira is not ready to be sent in the next few days, let me know and we can try to order one to start    RTC Monday, April 15 tonopen, OCT, then AC Check and dilate    Physician Attestation    Attending Physician Attestation:  Complete documentation of historical and exam elements from today's encounter can be found in the full encounter summary report (not reduplicated in this progress note). I personally obtained the chief complaint(s) and history of present illness. I confirmed and edited as necessary the review of systems, past medical/surgical history, family history, social history, and examination findings as documented by others; and I examined the patient myself. I personally reviewed the relevant tests, images, and reports as documented above. I formulated and edited as necessary the assessment and plan and discussed the findings and management plan with the patient and family members present at the time of this visit.  Ricky Dior M.D., Uveitis and Medical Retina, March 4, 2024     Again, thank you for allowing me to participate in the care of your patient.      Sincerely,    Ricky Dior MD  [facility] Dept of Ophthalmology  Uveitis and Medical Retina

## 2024-03-04 NOTE — NURSING NOTE
Chief Complaints and History of Present Illnesses   Patient presents with    Uveitis Follow-Up     Chief Complaint(s) and History of Present Illness(es)       Uveitis Follow-Up              Laterality: both eyes    Onset: gradual    Onset: months ago    Quality: States va is the same since last visit      Severity: moderate    Frequency: intermittently    Associated symptoms: glare (more at night), haloes (more at night), eye pain (pressure behind the eyes and brows which started on Wed.), photophobia, flashes and floaters    Treatments tried: eye drops    Pain scale: 0/10              Comments    Here for chronic anterior uveitis of both eyes  Prednisolone BID each eye   Timolol every day each eye  Prednisone 10 mg   Methotrexate   Folic acid  Yadira Barber COT 2:33 PM March 4, 2024

## 2024-03-04 NOTE — PROGRESS NOTES
Chief Complaint/Presenting Concern:  Uveitis follow up    Interval History of Present Ocular Illness:  Glory Oswald is a 28 year old patient who returns for follow up of her chronic anterior and intermediate uveitis. Last visit, we were awaiting starting Humira and modified drops and prednisone pills     Glory reports things have been about the same. There was a day or two last week when the eyes had some pressure sensation on the top and perhaps behind the eyes. This does not feel like a true flare but just feels different.     Interval Updates to Medical/Family/Social History:  Still waiting Humira    Relevant Review of Systems Updates: No changes. Some grogginess but not too bad on more methotrexate    Labs: None recently     Current eye related medications:  Prednisone 10 mg daily, Oral methotrexate 20 mg (8 tablets) weekly, folic acid 2 mg daily and 3 mg day of and day after methotrexate, prednisolone (steroid drop) 2x/day each eye, Timolol in the morning in each eye     Retina/Uveitis Imaging:   OCT Spectralis Macula March 4, 2024  right eye: More prominent vitreous opacities, but normal foveal contour, no fluid  left eye: Persistent opacity in vitreous seen on infrared image, normal foveal contour,no fluid    Assessment:     1. Chronic anterior uveitis of both eyes  Improving in each eye but still active    2. Intermediate uveitis of both eyes  Persistent haze    3. Cystoid macular edema of left eye  None in each eye     4. Bilateral ocular hypertension  Improved today on Timolol     5. High risk medication use  Prednisone 10 mg daily,     Plan/Recommendations:    Discussed findings with patient and her . The inflammation in the front of each eye is improved and there are still floaters and haziness in the back. We will keep prednisone and methotrexate unchanged, modify drops, and await starting Humira   Eye pressure is normal in each eye today, we should continue Timolol in the morning in each  eye   Recommend additional testing: None needed  Continue these medications unchanged: Prednisone 10 mg daily, Oral methotrexate 20 mg (8 tablets) weekly, folic acid 2 mg daily and 3 mg day of and day after methotrexate  For the prednisolone (steroid drop), let's increase back to 3x/day until next visit  Continue Timolol in the morning in each eye   Okay to start Humira 40 mg every 2 weeks. We will send a My chart If Humira is not ready to be sent in the next few days, let me know and we can try to order one to start    RTC Monday, April 15 aaron, OCT, then AC Check and dilate    Physician Attestation     Attending Physician Attestation:  Complete documentation of historical and exam elements from today's encounter can be found in the full encounter summary report (not reduplicated in this progress note). I personally obtained the chief complaint(s) and history of present illness. I confirmed and edited as necessary the review of systems, past medical/surgical history, family history, social history, and examination findings as documented by others; and I examined the patient myself. I personally reviewed the relevant tests, images, and reports as documented above. I formulated and edited as necessary the assessment and plan and discussed the findings and management plan with the patient and family members present at the time of this visit.  Ricky Dior M.D., Uveitis and Medical Retina, March 4, 2024

## 2024-03-04 NOTE — PATIENT INSTRUCTIONS
Continue these medications unchanged: Prednisone 10 mg daily, Oral methotrexate 20 mg (8 tablets) weekly, folic acid 2 mg daily and 3 mg day of and day after methotrexate  For the prednisolone (steroid drop), let's increase back to 3x/day until next visit  Continue Timolol in the morning in each eye   Okay to start Humira 40 mg every 2 weeks. We will send a My chart If Humira is not ready to be sent in the next few days, let me know and we can try to order one to start

## 2024-03-06 ENCOUNTER — TELEPHONE (OUTPATIENT)
Dept: OPHTHALMOLOGY | Facility: CLINIC | Age: 29
End: 2024-03-06
Payer: COMMERCIAL

## 2024-03-06 NOTE — TELEPHONE ENCOUNTER
PA Initiation    Medication: HUMIRA *CF* PEN 40 MG/0.4ML SC PNKT  Insurance Company: [employer] - Phone [phone] Fax [phone]  [facility]illing [facility] Rx[facility] [address]  Filling Pharmacy Phone:    Filling Pharmacy Fax:    Start Date: 3/6/2024    [id number]

## 2024-03-06 NOTE — TELEPHONE ENCOUNTER
Prior Authorization Approval    Medication: HUMIRA *CF* PEN 40 MG/0.4ML SC PNKT  Authorization Effective Date: 3/6/2024  Authorization Expiration Date: 9/2/2024  Approved Dose/Quantity: [id number]  Reference #: [id number]   Insurance Company: [employer] - Phone [phone] Fax [phone]  Expected CoPay: $    CoPay Card Available: Yes    Financial Assistance Needed: no  Which Pharmacy is filling the prescription: [facility] - [address] [place] [address]  Pharmacy Notified: yes  Patient Notified: yes

## 2024-04-01 DIAGNOSIS — H30.23 INTERMEDIATE UVEITIS OF BOTH EYES: ICD-10-CM

## 2024-04-02 ENCOUNTER — TRANSFERRED RECORDS (OUTPATIENT)
Dept: HEALTH INFORMATION MANAGEMENT | Facility: CLINIC | Age: 29
End: 2024-04-02
Payer: COMMERCIAL

## 2024-04-02 LAB
ALT SERPL-CCNC: 24 IU/L (ref 5–35)
AST SERPL-CCNC: 37 U/L (ref 5–34)
CREATININE (EXTERNAL): 0.63 MG/DL (ref 0.5–1.3)

## 2024-04-15 ENCOUNTER — OFFICE VISIT (OUTPATIENT)
Dept: OPHTHALMOLOGY | Facility: CLINIC | Age: 29
End: 2024-04-15
Attending: OPHTHALMOLOGY
Payer: COMMERCIAL

## 2024-04-15 DIAGNOSIS — H30.23 INTERMEDIATE UVEITIS OF BOTH EYES: Primary | ICD-10-CM

## 2024-04-15 DIAGNOSIS — H35.352 CYSTOID MACULAR EDEMA OF LEFT EYE: ICD-10-CM

## 2024-04-15 DIAGNOSIS — H40.053 BILATERAL OCULAR HYPERTENSION: ICD-10-CM

## 2024-04-15 DIAGNOSIS — Z79.899 HIGH RISK MEDICATION USE: ICD-10-CM

## 2024-04-15 DIAGNOSIS — H20.13 CHRONIC ANTERIOR UVEITIS OF BOTH EYES: ICD-10-CM

## 2024-04-15 PROCEDURE — 92133 CPTRZD OPH DX IMG PST SGM ON: CPT | Performed by: OPHTHALMOLOGY

## 2024-04-15 PROCEDURE — 99207 OCT OPTIC NERVE RNFL SPECTRALIS OU (BOTH EYES): CPT | Mod: 26 | Performed by: OPHTHALMOLOGY

## 2024-04-15 PROCEDURE — 99211 OFF/OP EST MAY X REQ PHY/QHP: CPT | Performed by: OPHTHALMOLOGY

## 2024-04-15 PROCEDURE — 99214 OFFICE O/P EST MOD 30 MIN: CPT | Performed by: OPHTHALMOLOGY

## 2024-04-15 PROCEDURE — 92134 CPTRZ OPH DX IMG PST SGM RTA: CPT | Performed by: OPHTHALMOLOGY

## 2024-04-15 RX ORDER — PREDNISONE 5 MG/1
TABLET ORAL
Qty: 90 TABLET | Refills: 1 | Status: SHIPPED | OUTPATIENT
Start: 2024-04-15 | End: 2024-09-09

## 2024-04-15 RX ORDER — DORZOLAMIDE HYDROCHLORIDE AND TIMOLOL MALEATE 20; 5 MG/ML; MG/ML
1 SOLUTION/ DROPS OPHTHALMIC 2 TIMES DAILY
Qty: 10 ML | Refills: 5 | Status: SHIPPED | OUTPATIENT
Start: 2024-04-15 | End: 2024-05-28

## 2024-04-15 ASSESSMENT — CUP TO DISC RATIO
OS_RATIO: 0.25
OD_RATIO: 0.2

## 2024-04-15 ASSESSMENT — TONOMETRY
OD_IOP_MMHG: 24
IOP_METHOD: TONOPEN
IOP_METHOD: TONOPEN
OD_IOP_MMHG: 31
IOP_METHOD: APPLANATION
OD_IOP_MMHG: 27
OS_IOP_MMHG: 18

## 2024-04-15 ASSESSMENT — EXTERNAL EXAM - RIGHT EYE: OD_EXAM: NORMAL

## 2024-04-15 ASSESSMENT — SLIT LAMP EXAM - LIDS
COMMENTS: NORMAL
COMMENTS: NORMAL

## 2024-04-15 ASSESSMENT — VISUAL ACUITY
OD_SC: 20/25
METHOD: SNELLEN - LINEAR
OS_SC: 20/30

## 2024-04-15 ASSESSMENT — EXTERNAL EXAM - LEFT EYE: OS_EXAM: NORMAL

## 2024-04-15 NOTE — LETTER
4/15/2024       RE: Glory Oswald  [address] [facility] [address]     Dear Colleague,    Thank you for referring your patient, Glory Oswald, to the [facility] [place] at [facility]. Please see a copy of my visit note below.    Chief Complaint/Presenting Concern:  Uveitis follow-up.    Interval History of Present Ocular Illness:  Glroy Oswald is a 28 year old patient who returns for follow up of chronic anterior and intermediate uveitis. At our last visit on 3/4/24, anterior inflammation in both eyes had improved, though there was continued haziness posteriorly. Prednisolone was increased to 3x/day in each eye.     Since then, Ms. Oswald reports that she has not started the Humira due to insurance issues and then miscommunication regarding whether of loading dose was needed. Issues are in the process of being resolved.    Glory is still having some irritation, floaters, blurred vision, and photophobia. She endorses some periocular pain which feels like more of a headache than eye pain. Haziness in the left eye is better than right eye.     Interval Updates to Medical/Family/Social History:    Saw Dr. Doyle who again supported the plan for Humira as above. No other treatments    Relevant Review of Systems Updates:  Sleep still is challenging. Grogginess is still an issue but somewhat tolerable on methotrexate    Laboratory Testing: No new lab testing.     Current eye related medications:   Drops: Prednisolone 3x/day each eye, Timolol once daily each eye  Systemic: Prednisone 10 mg daily,oral methotrexate 20 mg weekly, Folic acid 2 mg daily and 3 mg the day before and after methotrexate    Retina/Uveitis Imaging:   OCT Spectralis Macula April 15, 2024  right eye: Numerous stable vitreous opacities, slightly blunted foveal contour, no intraretinal fluid. No NFL thickening  left eye: Improved media haze, minimally blunted contour,  no intraretinal fluid. No NFL thickening    OCT Optic Nerve RNFL Spectralis April 15, 2024  right eye: average thickness 123, stable thickening, no thinning  left eye: average thickness 120, stable thickening, no thinning    Assessment:     1. Intermediate uveitis of both eyes  Some vitreous haze and opacities seen on OCT. No new macular edema.    2. Chronic anterior uveitis of both eyes  Inflammation still present in anterior chambers of both eyes though improving.    3. Cystoid macular edema of left eye  None present    4. Bilateral ocular hypertension  IOP slightly elevated in right eye: 27, 31, then 24. RNFL stable each eye without thinning.    5. High risk medication use  Prednisone 10 mg daily, oral methotrexate 20 mg weekly    Plan/Recommendations:    Discussed findings with patient and her . The inflammation is minimal and improving peripherally in the left eye. There is no macular edema. We will start modifying steroid drops and pills as we await the addition of Humira.  Eye pressure was 31,18. Right eye pressure a bit higher today compared to previous visits. Even without RNFL thinning, we should modify drops given ongoing need for steroid drops and pills.  Recommend additional testing: None needed.  For the Timolol, please continue once a day in the left eye. For the right eye, please take use Timolol 2x/day until you can get this new drop called Dorzolamide-Timolol 2x/day in the right eye (and then stop Timolol right eye).  For the prednisolone drop, let's reduce to 2x/day in the right eye and continue 3x/day left eye. If left eye still doing well on 4/30/24, then reduce to 2x/day in the left eye also.   For the Prednisone tablets, Take equivalent of 7.5 mg each AM until next visit.   Continue oral Methotrexate 20 mg (8 pills) weekly, Folic acid 2 mg daily and 3 mg the day before and after Methotrexate.  Plan to start Humira 40 mg every two weeks as soon as possible.    RTC Late May-early June  applanate. AC Check, then dilate, OCT (ordered)    Jolynn Mckeon, MS3    Attending Physician Attestation:  Complete documentation of historical and exam elements from today's encounter can be found in the full encounter summary report (not reduplicated in this progress note). I reviewed the chief complaint(s) and history of present illness, and  confirmed and edited as necessary the review of systems, past medical/surgical history, family history, social history, and examination findings as documented by others and the Medical Student    I examined the patient myself, discussed the findings, reviewed all ancillary testing data and modified these results and reports along with the assessment and plan with the Medical Student. I agree with the note as detailed above.   Ricky Dior M.D.  Uveitis and Medical Retina  April 15, 2024        Again, thank you for allowing me to participate in the care of your patient.      Sincerely,    Ricky Dior MD  [facility] Dept of Ophthalmology  Uveitis and Medical Retina

## 2024-04-15 NOTE — PROGRESS NOTES
Chief Complaint/Presenting Concern:  Uveitis follow-up    Interval History of Present Ocular Illness:  Glory Oswald is a 28 year old patient who returns for follow up of chronic anterior and intermediate uveitis. At our last visit on 3/4/24, anterior inflammation in both eyes had improved, though there was continued haziness posteriorly. Prednisolone was increased to 3x/day in each eye     Since then, Ms. Oswald reports that she has not started the Humira due to insurance issues and then miscommunication regarding whether of loading dose was needed. Issues are in the process of being resolved.    Glory is still having some irritation, floaters, blurred vision, and photophobia. She endorses some periocular pain which feels like more of a headache than eye pain.Haziness in the left eye is better than right eye     Interval Updates to Medical/Family/Social History:    Saw Dr. Doyle who again supported the plan for Humira as above. No other treatments    Relevant Review of Systems Updates:  Sleep still is challenging. Grogginess is still an issue but somewhat tolerable on methotrexate    Laboratory Testing: No new lab testing.     Current eye related medications:   Drops: Prednisolone 3x/day each eye, Timolol once daily each eye  Systemic: Prednisone 10 mg daily,oral methotrexate 20 mg weekly, Folic acid 2 mg daily and 3 mg the day before and after methotrexate    Retina/Uveitis Imaging:   OCT Spectralis Macula April 15, 2024  right eye: Numerous stable vitreous opacities, slightly blunted foveal contour, no intraretinal fluid. No NFL thickening  left eye: Improved media haze, minimally blunted contour, no intraretinal fluid. No NFL thickening    OCT Optic Nerve RNFL Spectralis April 15, 2024  right eye: average thickness 123, stable thickening, no thinning  left eye: average thickness 120, stable thickening, no thinning    Assessment:     1. Intermediate uveitis of both eyes  Some vitreous haze and opacities  seen on OCT. No new macular edema.    2. Chronic anterior uveitis of both eyes  Inflammation still present in anterior chambers of both eyes though improving.    3. Cystoid macular edema of left eye  None present    4. Bilateral ocular hypertension  IOP slightly elevated in right eye: 27, 31, then 24. RNFL stable each eye without thinning.    5. High risk medication use  Prednisone 10 mg daily, oral methotrexate 20 mg weekly    Plan/Recommendations:    Discussed findings with patient and her . The inflammation is minimal and improving peripherally in the left eye. There is no macular edema. We will start modifying steroid drops and pills as we await the addition of Humira.  Eye pressure was 31,18. Right eye pressure a bit higher today compared to previous visits. Even without RNFL thinning, we should modify drops given ongoing need for steroid drops and pills  Recommend additional testing: None needed.  For the Timolol, please continue once a day in the left eye. For the right eye, please take use timolol 2x/day until you can get this new drop called Dorzolamide-Timolol 2x/day in the right eye (and then stop Timolol right eye)  For the prednisolone drop, let's reduce to 2x/day in the right eye and continue 3x/day left eye. If left eye still doing well on 4/30/24, then reduce to 2x/day in the left eye also   For the Prednisone tablets, Take equivalent of 7.5 mg each AM until next visit.   Continue oral methotrexate 20 mg (8 pills) weekly, Folic acid 2 mg daily and 3 mg the day before and after methotrexate  Plan to start Humira 40 mg every two weeks as soon as possible.    RTC Late May-early June applanate. AC Check, then dilate, OCT (ordered)    Jolynn Mckeon, MS3    Attending Physician Attestation:  Complete documentation of historical and exam elements from today's encounter can be found in the full encounter summary report (not reduplicated in this progress note). I reviewed the chief complaint(s) and  history of present illness, and  confirmed and edited as necessary the review of systems, past medical/surgical history, family history, social history, and examination findings as documented by others and the Medical Student    I examined the patient myself, discussed the findings, reviewed all ancillary testing data and modified these results and reports along with the assessment and plan with the Medical Student. I agree with the note as detailed above.   Ricky Dior M.D.  Uveitis and Medical Retina  April 15, 2024

## 2024-04-15 NOTE — PATIENT INSTRUCTIONS
For the Timolol, please continue once a day in the left eye. For the right eye, please take use timolol 2x/day until you can get this new drop called Dorzolamide-Timolol 2x/day in the right eye (and then stop Timolol right eye)  For the prednisolone drop, let's reduce to 2x/day in the right eye and continue 3x/day left eye. If left eye still doing well on 4/30/24, then reduce to 2x/day in the left eye also   For the Prednisone tablets, Take equivalent of 7.5 mg each AM until next visit.   Continue oral methotrexate 20 mg (8 pills) weekly, Folic acid 2 mg daily and 3 mg the day before and after methotrexate  Plan to start Humira 40 mg every two weeks as soon as possible.

## 2024-04-15 NOTE — NURSING NOTE
Chief Complaints and History of Present Illnesses   Patient presents with    Uveitis Follow-Up     Chief Complaint(s) and History of Present Illness(es)       Uveitis Follow-Up              Laterality: both eyes    Onset: months ago    Quality: States va is the same since last visit      Associated symptoms: glare, eye pain (little pain above the brows), headache, photophobia, flashes and floaters    Treatments tried: eye drops    Pain scale: 0/10              Comments    Here for chronic anterior uveitis of both eyes  Prednisolone TID each eye   Timolol every day each eye   Prednisone 10 mg   Methotrexate   Folic acid  Yadira Barber COT 2:49 PM April 15, 2024

## 2024-05-28 ENCOUNTER — OFFICE VISIT (OUTPATIENT)
Dept: OPHTHALMOLOGY | Facility: CLINIC | Age: 29
End: 2024-05-28
Attending: OPHTHALMOLOGY
Payer: COMMERCIAL

## 2024-05-28 DIAGNOSIS — H20.13 CHRONIC ANTERIOR UVEITIS OF BOTH EYES: Primary | ICD-10-CM

## 2024-05-28 DIAGNOSIS — H40.053 BILATERAL OCULAR HYPERTENSION: ICD-10-CM

## 2024-05-28 DIAGNOSIS — H30.23 INTERMEDIATE UVEITIS OF BOTH EYES: ICD-10-CM

## 2024-05-28 DIAGNOSIS — H35.352 CYSTOID MACULAR EDEMA OF LEFT EYE: ICD-10-CM

## 2024-05-28 DIAGNOSIS — Z79.899 HIGH RISK MEDICATION USE: ICD-10-CM

## 2024-05-28 PROCEDURE — 92134 CPTRZ OPH DX IMG PST SGM RTA: CPT | Performed by: OPHTHALMOLOGY

## 2024-05-28 PROCEDURE — 99214 OFFICE O/P EST MOD 30 MIN: CPT | Performed by: OPHTHALMOLOGY

## 2024-05-28 PROCEDURE — 99211 OFF/OP EST MAY X REQ PHY/QHP: CPT | Mod: 25 | Performed by: OPHTHALMOLOGY

## 2024-05-28 RX ORDER — DORZOLAMIDE HYDROCHLORIDE AND TIMOLOL MALEATE 20; 5 MG/ML; MG/ML
1 SOLUTION/ DROPS OPHTHALMIC 2 TIMES DAILY
Qty: 10 ML | Refills: 5 | Status: SHIPPED | OUTPATIENT
Start: 2024-05-28 | End: 2024-07-08

## 2024-05-28 ASSESSMENT — TONOMETRY
OS_IOP_MMHG: 27
IOP_METHOD: APP BY JY
OD_IOP_MMHG: 28
OD_IOP_MMHG: 24
IOP_METHOD: APPLANATION
OS_IOP_MMHG: 24

## 2024-05-28 ASSESSMENT — VISUAL ACUITY
OS_SC: 20/40-2
OD_SC: 20/20-2
OS_PH_SC: 20/20
METHOD: SNELLEN - LINEAR

## 2024-05-28 ASSESSMENT — CUP TO DISC RATIO
OD_RATIO: 0.2
OS_RATIO: 0.25

## 2024-05-28 ASSESSMENT — SLIT LAMP EXAM - LIDS
COMMENTS: NORMAL
COMMENTS: NORMAL

## 2024-05-28 ASSESSMENT — EXTERNAL EXAM - RIGHT EYE: OD_EXAM: NORMAL

## 2024-05-28 ASSESSMENT — EXTERNAL EXAM - LEFT EYE: OS_EXAM: NORMAL

## 2024-05-28 NOTE — PATIENT INSTRUCTIONS
Regarding the Humira, continue 40 mg every 14 days. This has done great along with the methotrexate!   Continue Oral Methotrexate 20 mg weekly, Folic acid 2mg every day and 3mg day before and the day after methotrexate  For the prednisone pills, let's continue 7.5 mg each day until 6/17/24.. Then starting 6/18/24, reduce to 5 mg each AM until next visit.  Regarding the prednisolone drops, let's continue 2x/day in each eye through 6/30/24. Then starting on July 1, please reduce to just 1x/day in each eye until next visit  For the pressure lowering drops, let's stop the Timolol in the left eye and now use Dorzolamide-Timolol 2x/day in each eye until next visit

## 2024-05-28 NOTE — LETTER
5/28/2024       RE: Glory sOwald  [address] [facility] [address]     Dear Colleague,    Thank you for referring your patient, Glory Oswald, to the [facility] [place] at [facility]. Please see a copy of my visit note below.    Chief Complaint/Presenting Concern:  Uveitis follow up    Interval History of Present Ocular Illness:  Glory Oswald is a 28 year old patient who returns for follow up of her chronic anterior and intermediate uveitis. Last visit, we looked forward to starting Humira which was first taken a few days after our visit.    Glory reports things are going okay, perhaps a little better. There is less discomfort under eyebrows. Eyes are still sensitive to light.    Interval Updates to Medical/Family/Social History:    Humira going well. Next Rheumatology visit in July     Relevant Review of Systems Updates:  Some fogginess day of Humira but otherwise okay.      Current eye related medications: Dorzolamide-timolol 2x/day right eye, Timolol every day left eye, Prednisolone 2x/day each eye, Prednisone tablets 7.5mg every day, Oral Methotrexate 20 mg weekly, Folic acid 2mg every day and 3mg day before and the day after Methotrexate, Now Humira 40 mg every 40 mg every 14 days (3 dose of Humira)    Retina/Uveitis Imaging:   OCT Spectralis Macula May 28, 2024  right eye: Improving vitreous opacities, normal contour, no fluid. No NFL thickening, no PP fluid  left eye: Improving vitreous opacities, normal contour, no fluid. No NFL thickening, no PP fluid          Assessment:     1. Chronic anterior uveitis of both eyes  Improving on Humira!    2. Intermediate uveitis of both eyes  Improving as well.     3. Cystoid macular edema of left eye  None in either eye    4. Bilateral ocular hypertension  Still slightly elevated in each eye     5. High risk medication use  Now on Humira 40 mg every 14 days, Oral Methotrexate 20 mg  weekly, Folic acid 2mg every day and 3mg day before and the day after methotrexate, Prednisone tablets 7.5mg every day    Plan/Recommendations:    Discussed findings with patient and her . The eyes are doing well with improving inflammation in the front and back of each eye and there is no macular edema. We will modify steroid drops and pills soon as we continue on Humira and Methotrexate!  Eye pressure is 24 in each eye. We will modify pressure lowering treatments while we taper steroid  Recommend additional testing when you see Dr. Doyle  Regarding the Humira, continue 40 mg every 14 days. This has done great along with the Methotrexate!   Continue Oral Methotrexate 20 mg weekly, Folic acid 2mg every day and 3mg day before and the day after Methotrexate  For the Prednisone pills, let's continue 7.5 mg each day until 6/17/24.. Then starting 6/18/24, reduce to 5 mg each AM until next visit.  Regarding the Prednisolone drops, let's continue 2x/day in each eye through 6/30/24. Then starting on July 1, please reduce to just 1x/day in each eye until next visit  For the pressure lowering drops, let's stop the Timolol in the left eye and now use Dorzolamide-Timolol 2x/day in each eye until next visit    [facility] July 8 tonopen, AC Check, then dilate, OCT (ordered)     Dr. Doyle July 10    Physician Attestation    Attending Physician Attestation:  Complete documentation of historical and exam elements from today's encounter can be found in the full encounter summary report (not reduplicated in this progress note). I personally obtained the chief complaint(s) and history of present illness. I confirmed and edited as necessary the review of systems, past medical/surgical history, family history, social history, and examination findings as documented by others; and I examined the patient myself. I personally reviewed the relevant tests, images, and reports as documented above. I formulated and edited as necessary  the assessment and plan and discussed the findings and management plan with the patient and family members present at the time of this visit.  Ricky Dior M.D., Uveitis and Medical Retina, May 28, 2024     Again, thank you for allowing me to participate in the care of your patient.            Sincerely,    Ricky Dior MD  [facility] Dept of Ophthalmology  Uveitis and Medical Retina

## 2024-05-28 NOTE — LETTER
5/28/2024       RE: Glory Oswald  [address] [facility] [address]     Dear Colleague,    Thank you for referring your patient, Glory Oswald, to the [facility] [place] at [facility]. Please see a copy of my visit note below.    Chief Complaint/Presenting Concern:  Uveitis follow up    Interval History of Present Ocular Illness:  Glory Oswald is a 28 year old patient who returns for follow up of her chronic anterior and intermediate uveitis. Last visit, we looked forward to starting Humira which was first taken a few days after our visit.    Glory reports things are going okay, perhaps a little better. There is less discomfort under eye brows. Eyes are still sensitive to light.    Interval Updates to Medical/Family/Social History:    Humira going well. Next Rheumatology visit in July     Relevant Review of Systems Updates:  Some fogginess day of Humira but otherwise okay.      Current eye related medications: Dorzolamide-timolol 2x/day right eye, Timolol every day left eye, Prednisolone 2x/day each eye, Prednisone tablets 7.5mg every day, Oral Methotrexate 20 mg weekly, Folic acid 2mg every day and 3mg day before and the day after methotrexate, Now Humira 40 mg every 40 mg every 14 days (3 dose of Humira)    Retina/Uveitis Imaging:   OCT Spectralis Macula May 28, 2024  right eye: Improving vitreous opacities, normal contour, no fluid. No NFL thickening, no PP fluid  left eye: Improving vitreous opacities, normal contour, no fluid. No NFL thickening, no PP fluid    Assessment:     1. Chronic anterior uveitis of both eyes  Improving on Humira!    2. Intermediate uveitis of both eyes  Improving as well.     3. Cystoid macular edema of left eye  None in either eye    4. Bilateral ocular hypertension  Still slightly elevated in each eye     5. High risk medication use  Now on Humira 40 mg every 14 days, Oral Methotrexate 20 mg  weekly, Folic acid 2mg every day and 3mg day before and the day after methotrexate, Prednisone tablets 7.5mg every day    Plan/Recommendations:    Discussed findings with patient and her . The eyes are doing well with improving inflammation in the front and back of each eye and there is no macular edema. We will modify steroid drops and pills soon as we continue on Humira and Methotrexate!  Eye pressure is 24 in each eye. We will modify pressure lowering treatments while we taper steroid  Recommend additional testing when you see Dr. Doyle  Regarding the Humira, continue 40 mg every 14 days. This has done great along with the methotrexate!   Continue Oral Methotrexate 20 mg weekly, Folic acid 2mg every day and 3mg day before and the day after methotrexate  For the prednisone pills, let's continue 7.5 mg each day until 6/17/24.. Then starting 6/18/24, reduce to 5 mg each AM until next visit.  Regarding the prednisolone drops, let's continue 2x/day in each eye through 6/30/24. Then starting on July 1, please reduce to just 1x/day in each eye until next visit  For the pressure lowering drops, let's stop the Timolol in the left eye and now use Dorzolamide-Timolol 2x/day in each eye until next visit    [facility] [place] July 8 tonopen, AC Check, then dilate, OCT (ordered)     Dr. Doyle July 10    Physician Attestation    Attending Physician Attestation:  Complete documentation of historical and exam elements from today's encounter can be found in the full encounter summary report (not reduplicated in this progress note). I personally obtained the chief complaint(s) and history of present illness. I confirmed and edited as necessary the review of systems, past medical/surgical history, family history, social history, and examination findings as documented by others; and I examined the patient myself. I personally reviewed the relevant tests, images, and reports as documented above. I formulated and edited as necessary  the assessment and plan and discussed the findings and management plan with the patient and family members present at the time of this visit.  Ricky Dior M.D., Uveitis and Medical Retina, May 28, 2024       Again, thank you for allowing me to participate in the care of your patient.      Sincerely,    Ricky Dior MD

## 2024-05-28 NOTE — PROGRESS NOTES
Chief Complaint/Presenting Concern:  Uveitis follow up    Interval History of Present Ocular Illness:  Glory Oswald is a 28 year old patient who returns for follow up of her chronic anterior and intermediate uveitis. Last visit, we looked forward to starting Humira which was first taken a few days after our visit.    Glory reports things are going okay, perhaps a little better. There is less discomfort under eye brows. Eyes are still sensitive to light.    Interval Updates to Medical/Family/Social History:    Humira going well. Next Rheumatology visit in July     Relevant Review of Systems Updates:  Some fogginess day of Humira but otherwise okay.      Current eye related medications: Dorzolamide-timolol 2x/day right eye, Timolol every day left eye, Prednisolone 2x/day each eye, Prednisone tablets 7.5mg every day, Oral Methotrexate 20 mg weekly, Folic acid 2mg every day and 3mg day before and the day after methotrexate, Now Humira 40 mg every 40 mg every 14 days (3 dose of Humira)    Retina/Uveitis Imaging:   OCT Spectralis Macula May 28, 2024  right eye: Improving vitreous opacities, normal contour, no fluid. No NFL thickening, no PP fluid  left eye: Improving vitreous opacities, normal contour, no fluid. No NFL thickening, no PP fluid    Assessment:     1. Chronic anterior uveitis of both eyes  Improving on Humira!    2. Intermediate uveitis of both eyes  Improving as well.     3. Cystoid macular edema of left eye  None in either eye    4. Bilateral ocular hypertension  Still slightly elevated in each eye     5. High risk medication use  Now on Humira 40 mg every 14 days, Oral Methotrexate 20 mg weekly, Folic acid 2mg every day and 3mg day before and the day after methotrexate, Prednisone tablets 7.5mg every day    Plan/Recommendations:    Discussed findings with patient and her . The eyes are doing well with improving inflammation in the front and back of each eye and there is no macular edema. We  will modify steroid drops and pills soon as we continue on Humira and Methotrexate!  Eye pressure is 24 in each eye. We will modify pressure lowering treatments while we taper steroid  Recommend additional testing when you see Dr. Doyle  Regarding the Humira, continue 40 mg every 14 days. This has done great along with the methotrexate!   Continue Oral Methotrexate 20 mg weekly, Folic acid 2mg every day and 3mg day before and the day after methotrexate  For the prednisone pills, let's continue 7.5 mg each day until 6/17/24.. Then starting 6/18/24, reduce to 5 mg each AM until next visit.  Regarding the prednisolone drops, let's continue 2x/day in each eye through 6/30/24. Then starting on July 1, please reduce to just 1x/day in each eye until next visit  For the pressure lowering drops, let's stop the Timolol in the left eye and now use Dorzolamide-Timolol 2x/day in each eye until next visit    [facility] Dior July 8 tonopen, AC Check, then dilate, OCT (ordered)     Dr. Doyle July 10    Physician Attestation     Attending Physician Attestation:  Complete documentation of historical and exam elements from today's encounter can be found in the full encounter summary report (not reduplicated in this progress note). I personally obtained the chief complaint(s) and history of present illness. I confirmed and edited as necessary the review of systems, past medical/surgical history, family history, social history, and examination findings as documented by others; and I examined the patient myself. I personally reviewed the relevant tests, images, and reports as documented above. I formulated and edited as necessary the assessment and plan and discussed the findings and management plan with the patient and family members present at the time of this visit.  Ricky Dior M.D., Uveitis and Medical Retina, May 28, 2024

## 2024-06-30 RX ORDER — PREDNISONE 2.5 MG/1
TABLET ORAL
Qty: 90 TABLET | Refills: 1 | OUTPATIENT
Start: 2024-06-30

## 2024-07-08 ENCOUNTER — OFFICE VISIT (OUTPATIENT)
Dept: OPHTHALMOLOGY | Facility: CLINIC | Age: 29
End: 2024-07-08
Attending: OPHTHALMOLOGY
Payer: COMMERCIAL

## 2024-07-08 DIAGNOSIS — H35.352 CYSTOID MACULAR EDEMA OF LEFT EYE: ICD-10-CM

## 2024-07-08 DIAGNOSIS — Z79.899 HIGH RISK MEDICATION USE: ICD-10-CM

## 2024-07-08 DIAGNOSIS — H20.13 CHRONIC ANTERIOR UVEITIS OF BOTH EYES: Primary | ICD-10-CM

## 2024-07-08 DIAGNOSIS — H40.053 BILATERAL OCULAR HYPERTENSION: ICD-10-CM

## 2024-07-08 DIAGNOSIS — H30.23 INTERMEDIATE UVEITIS OF BOTH EYES: ICD-10-CM

## 2024-07-08 PROCEDURE — 99213 OFFICE O/P EST LOW 20 MIN: CPT | Performed by: OPHTHALMOLOGY

## 2024-07-08 PROCEDURE — 99214 OFFICE O/P EST MOD 30 MIN: CPT | Performed by: OPHTHALMOLOGY

## 2024-07-08 PROCEDURE — 92134 CPTRZ OPH DX IMG PST SGM RTA: CPT | Performed by: OPHTHALMOLOGY

## 2024-07-08 RX ORDER — ACETAZOLAMIDE 250 MG/1
500 TABLET ORAL ONCE
Status: DISPENSED | OUTPATIENT
Start: 2024-07-08

## 2024-07-08 RX ORDER — DORZOLAMIDE HYDROCHLORIDE AND TIMOLOL MALEATE 20; 5 MG/ML; MG/ML
1 SOLUTION/ DROPS OPHTHALMIC 3 TIMES DAILY
Qty: 10 ML | Refills: 5 | Status: SHIPPED | OUTPATIENT
Start: 2024-07-08 | End: 2024-08-13

## 2024-07-08 RX ORDER — PREDNISOLONE ACETATE 10 MG/ML
1 SUSPENSION/ DROPS OPHTHALMIC DAILY
Qty: 15 ML | Refills: 4 | Status: SHIPPED | OUTPATIENT
Start: 2024-07-08

## 2024-07-08 RX ORDER — BRIMONIDINE TARTRATE 2 MG/ML
1 SOLUTION/ DROPS OPHTHALMIC 3 TIMES DAILY
Qty: 15 ML | Refills: 4 | Status: SHIPPED | OUTPATIENT
Start: 2024-07-08 | End: 2024-09-09

## 2024-07-08 ASSESSMENT — VISUAL ACUITY
OS_PH_SC: 20/20
OS_SC: 20/50
OS_PH_SC+: -1
METHOD: SNELLEN - LINEAR
OD_SC: 20/20
OD_SC+: -1

## 2024-07-08 ASSESSMENT — TONOMETRY
OS_IOP_MMHG: 21
OD_IOP_MMHG: 38
IOP_METHOD: TONOPEN
OD_IOP_MMHG: 36
IOP_METHOD: TONOPEN
OD_IOP_MMHG: 24
OS_IOP_MMHG: 20
IOP_METHOD: TONOPEN

## 2024-07-08 ASSESSMENT — CONF VISUAL FIELD
OS_SUPERIOR_NASAL_RESTRICTION: 0
OS_SUPERIOR_TEMPORAL_RESTRICTION: 0
OD_NORMAL: 1
OD_INFERIOR_NASAL_RESTRICTION: 0
OS_INFERIOR_TEMPORAL_RESTRICTION: 0
OS_NORMAL: 1
OD_SUPERIOR_NASAL_RESTRICTION: 0
METHOD: COUNTING FINGERS
OS_INFERIOR_NASAL_RESTRICTION: 0
OD_SUPERIOR_TEMPORAL_RESTRICTION: 0
OD_INFERIOR_TEMPORAL_RESTRICTION: 0

## 2024-07-08 ASSESSMENT — CUP TO DISC RATIO
OD_RATIO: 0.2
OS_RATIO: 0.25

## 2024-07-08 ASSESSMENT — EXTERNAL EXAM - RIGHT EYE: OD_EXAM: NORMAL

## 2024-07-08 ASSESSMENT — SLIT LAMP EXAM - LIDS
COMMENTS: NORMAL
COMMENTS: NORMAL

## 2024-07-08 ASSESSMENT — EXTERNAL EXAM - LEFT EYE: OS_EXAM: NORMAL

## 2024-07-08 NOTE — NURSING NOTE
Chief Complaints and History of Present Illnesses   Patient presents with    Uveitis Follow-Up     Chief Complaint(s) and History of Present Illness(es)       Uveitis Follow-Up              Laterality: both eyes    Onset: gradual    Onset: months ago    Quality: States va has decreased a little      Severity: moderate    Frequency: intermittently    Associated symptoms: photophobia (more at night), flashes and floaters (has increased)    Treatments tried: eye drops    Pain scale: 0/10              Comments    Here for chronic anterior uveitis of both eyes  Prednisolone BID  each eye   Dorzolamide-Timolol 2x/day in each eye  Prednisone 5 mg  Humira  Methotrexate   Folic acid  Yadira Barber COT 3:37 PM July 8, 2024

## 2024-07-08 NOTE — PROGRESS NOTES
Chief Complaint/Presenting Concern:  Uveitis follow up    Interval History of Present Ocular Illness:  Glory Oswald is a 28 year old patient who returns for follow up of her uveitis of each eye. Last visit things were improving after starting Humira so we made plans to modify prednisone and drops.     Glory had one Humira misfire as noted. There is still some pain in the the eyes/irritation particularly in by the nose. There have been some allergy changes as well Glory noted some pain was present today in the right eye when looking up. Floaters are still there, perhaps more blurriness left eye.     Interval Updates to Medical/Family/Social History:    Glory had one Humira misfire one time and was informed to just skip dose and then the next one is this coming Saturday.   Will see Dr. Doyle this week    Relevant Review of Systems Updates:  No new illnesses. Still having the brain fog, still having fatigue especially over the weekend (perhaps slightly more intense).    Labs: None recently     Current eye related medications:   Drops: Dorzolamide-timolol 2x/day both eyes, Prednisolone 2x/day each eye  Systemic: Prednisone tablets 5 mg every day, Oral Methotrexate 20 mg weekly, Folic acid 2mg every day and 3mg day before and the day after methotrexate, Humira 40 mg every 40 mg every 14 days (6 doses total, last was 6/15/24 due to one misfire 10 days ago)    Retina/Uveitis Imaging:   OCT Spectralis Macula July 8, 2024  right eye: Some more visible vitreous opacities, no macular edema. No NFL Thickening, no PP fluid  left eye:  Improved mild vitreous opacity, normal contour, no fluid. No NFL thickening    Assessment:     1. Chronic anterior uveitis of both eyes  Improved again! Only one cell right eye and few cells left eye     2. Intermediate uveitis of both eyes  Some opacities on scan right eye, but some haze viewable on undilated exam left eye     3. Cystoid macular edema of left eye  None in either  eye    4. Bilateral ocular hypertension  Elevated in the right eye on entry, better on drops and Diamox pills.     5. High risk medication use  Prednisone 5 mg every day, Oral Methotrexate 20 mg weekly, Folic acid 2mg every day and 3mg day before and the day after methotrexate, Humira 40 mg every 40 mg every 14 days --last dose misfire, so last used 3.5 weeks ago    Plan/Recommendations:    Discussed findings with patient and her . The front of each eye continue to improved although there is still some vitreous haze but no macular edema in either eye. We did not dilate as eye pressure was elevated. As below, we will taper prednisolone drop and soon taper oral prednisone  Eye pressure was 36, 21 on entry. The eye pressure in the right eye improved after pressure lowering drops and oral acetazolamide in the clinic. We will modify regular drop use as below   Recommend additional testing when scheduled with Dr. Doyle  Continue current pressure lowering drop (Dorzolamide-Timolol), but let's increase 3x/day in each eye   Add a new drop called Brimonidine 3x/day in each eye to help lower pressure  For the steroid drop (prednisolone), reduce to once daily in each eye  For the prednisone pills, continue 5 mg each AM with food for 3 more weeks. Then starting on July 29, reduce to 2.5 mg each AM until next visit.  Continue same dose of Oral Methotrexate 20 mg (8 pills)  weekly, Folic acid 2mg every day and 3mg day before and the day after methotrexate  Continue Humira 40 mg every 40 mg every 14 days. At this point, there is no need to go to weekly given things are doing well  We will check for glasses next time on lower dose of prednisone and drops    RTC Dr. Doyle in 2 days as scheduled    [facility] week of 7/29, tonopen, Refract, no dilation, no testing    Physician Attestation     Attending Physician Attestation:  Complete documentation of historical and exam elements from today's encounter can be found in the full  encounter summary report (not reduplicated in this progress note). I personally obtained the chief complaint(s) and history of present illness. I confirmed and edited as necessary the review of systems, past medical/surgical history, family history, social history, and examination findings as documented by others; and I examined the patient myself. I personally reviewed the relevant tests, images, and reports as documented above. I formulated and edited as necessary the assessment and plan and discussed the findings and management plan with the patient and family members present at the time of this visit.  Ricky Dior M.D., Uveitis and Medical Retina, July 8, 2024      16-Dec-2022

## 2024-07-08 NOTE — PATIENT INSTRUCTIONS
Continue current pressure lowering drop (Dorzolamide-Timolol), but let's increase 3x/day in each eye   Add a new drop called Brimonidine 3x/day in each eye to help lower pressure  For the steroid drop (prednisolone), reduce to once daily in each eye  For the prednisone pills, continue 5 mg each AM with food for 3 more weeks. Then starting on July 29, reduce to 2.5 mg each AM until next visit.  Continue same dose of Oral Methotrexate 20 mg (8 pills)  weekly, Folic acid 2mg every day and 3mg day before and the day after methotrexate  Continue Humira 40 mg every 40 mg every 14 days. At this point, there is no need to go to weekly given things are doing well  We will check for glasses next time on lower dose of prednisone and drops

## 2024-07-08 NOTE — LETTER
7/8/2024       RE: Glory Oswald  [address] [address]     Dear Colleague,    Thank you for referring your patient, Glory Oswald, to the [facility] [place] at [facility]. Please see a copy of my visit note below.    Chief Complaint/Presenting Concern:  Uveitis follow up.    Interval History of Present Ocular Illness:  Glory Oswald is a 28 year old patient who returns for follow up of her uveitis of each eye. Last visit things were improving after starting Humira so we made plans to modify prednisone and drops.     Glory had one Humira misfire as noted. There is still some pain in the the eyes/irritation particularly in by the nose. There have been some allergy changes as well Glory noted some pain was present today in the right eye when looking up. Floaters are still there, perhaps more blurriness left eye.     Interval Updates to Medical/Family/Social History:    Glory had one Humira misfire one time and was informed to just skip dose and then the next one is this coming Saturday.   Will see Dr. Doyle this week    Relevant Review of Systems Updates:  No new illnesses. Still having the brain fog, still having fatigue especially over the weekend (perhaps slightly more intense).    Labs: None recently     Current eye related medications:   Drops: Dorzolamide-timolol 2x/day both eyes, Prednisolone 2x/day each eye  Systemic: Prednisone tablets 5 mg every day, Oral Methotrexate 20 mg weekly, Folic acid 2mg every day and 3mg day before and the day after methotrexate, Humira 40 mg every 40 mg every 14 days (6 doses total, last was 6/15/24 due to one misfire 10 days ago)    Retina/Uveitis Imaging:   OCT Spectralis Macula July 8, 2024  right eye: Some more visible vitreous opacities, no macular edema. No NFL Thickening, no PP fluid  left eye:  Improved mild vitreous opacity, normal contour, no fluid. No NFL  thickening    Assessment:     1. Chronic anterior uveitis of both eyes  Improved again! Only one cell right eye and few cells left eye     2. Intermediate uveitis of both eyes  Some opacities on scan right eye, but some haze viewable on undilated exam left eye     3. Cystoid macular edema of left eye  None in either eye    4. Bilateral ocular hypertension  Elevated in the right eye on entry, better on drops and Diamox pills.     5. High risk medication use  Prednisone 5 mg every day, Oral Methotrexate 20 mg weekly, Folic acid 2mg every day and 3mg day before and the day after methotrexate, Humira 40 mg every 40 mg every 14 days --last dose misfire, so last used 3.5 weeks ago    Plan/Recommendations:    Discussed findings with patient and her . The front of each eye continue to improved although there is still some vitreous haze but no macular edema in either eye. We did not dilate as eye pressure was elevated. As below, we will taper prednisolone drop and soon taper oral prednisone.  Eye pressure was 36, 21 on entry. The eye pressure in the right eye improved after pressure lowering drops and oral acetazolamide in the clinic. We will modify regular drop use as below.   Recommend additional testing when scheduled with Dr. Doyle.  Continue current pressure lowering drop (Dorzolamide-Timolol), but let's increase 3x/day in each eye.  Add a new drop called Brimonidine 3x/day in each eye to help lower pressure.  For the steroid drop (prednisolone), reduce to once daily in each eye  For the prednisone pills, continue 5 mg each AM with food for 3 more weeks. Then starting on July 29, reduce to 2.5 mg each AM until next visit.  Continue same dose of Oral Methotrexate 20 mg (8 pills)  weekly, Folic acid 2mg every day and 3mg day before and the day after methotrexate  Continue Humira 40 mg every 40 mg every 14 days. At this point, there is no need to go to weekly given things are doing well.  We will check for glasses  next time on lower dose of prednisone and drops.    RTC Dr. Doyle in 2 days as scheduled    [facility] week of 7/29, tonopen, Refract, no dilation, no testing    Physician Attestation    Attending Physician Attestation:  Complete documentation of historical and exam elements from today's encounter can be found in the full encounter summary report (not reduplicated in this progress note). I personally obtained the chief complaint(s) and history of present illness. I confirmed and edited as necessary the review of systems, past medical/surgical history, family history, social history, and examination findings as documented by others; and I examined the patient myself. I personally reviewed the relevant tests, images, and reports as documented above. I formulated and edited as necessary the assessment and plan and discussed the findings and management plan with the patient and family members present at the time of this visit.  Ricky Dior M.D., Uveitis and Medical Retina, July 8, 2024       Again, thank you for allowing me to participate in the care of your patient.      Sincerely,    Ricky Dior MD  [facility] Dept of Ophthalmology  Uveitis and Medical Retina

## 2024-07-29 ENCOUNTER — OFFICE VISIT (OUTPATIENT)
Dept: OPHTHALMOLOGY | Facility: CLINIC | Age: 29
End: 2024-07-29
Attending: OPHTHALMOLOGY
Payer: COMMERCIAL

## 2024-07-29 DIAGNOSIS — Z79.899 HIGH RISK MEDICATION USE: ICD-10-CM

## 2024-07-29 DIAGNOSIS — H30.23 INTERMEDIATE UVEITIS OF BOTH EYES: ICD-10-CM

## 2024-07-29 DIAGNOSIS — H20.13 CHRONIC ANTERIOR UVEITIS OF BOTH EYES: Primary | ICD-10-CM

## 2024-07-29 DIAGNOSIS — H40.053 BILATERAL OCULAR HYPERTENSION: ICD-10-CM

## 2024-07-29 PROCEDURE — 99213 OFFICE O/P EST LOW 20 MIN: CPT | Performed by: OPHTHALMOLOGY

## 2024-07-29 ASSESSMENT — VISUAL ACUITY
OS_PH_SC+: -1
METHOD: SNELLEN - LINEAR
OS_SC+: -2
OS_PH_SC: 20/20
OD_SC: 20/25
OD_SC+: -2
OS_SC: 20/40

## 2024-07-29 ASSESSMENT — CUP TO DISC RATIO
OD_RATIO: 0.2
OS_RATIO: 0.25

## 2024-07-29 ASSESSMENT — CONF VISUAL FIELD
OD_NORMAL: 1
METHOD: COUNTING FINGERS
OS_INFERIOR_NASAL_RESTRICTION: 0
OD_SUPERIOR_TEMPORAL_RESTRICTION: 0
OD_SUPERIOR_NASAL_RESTRICTION: 0
OD_INFERIOR_NASAL_RESTRICTION: 0
OS_SUPERIOR_TEMPORAL_RESTRICTION: 0
OD_INFERIOR_TEMPORAL_RESTRICTION: 0
OS_INFERIOR_TEMPORAL_RESTRICTION: 0
OS_SUPERIOR_NASAL_RESTRICTION: 0
OS_NORMAL: 1

## 2024-07-29 ASSESSMENT — REFRACTION_MANIFEST
OS_CYLINDER: +0.25
OS_AXIS: 045
OD_SPHERE: -0.25
OD_CYLINDER: SPHERE
OS_SPHERE: -0.75

## 2024-07-29 ASSESSMENT — TONOMETRY
IOP_METHOD: TONOPEN
OD_IOP_MMHG: 20
OS_IOP_MMHG: 10

## 2024-07-29 ASSESSMENT — SLIT LAMP EXAM - LIDS
COMMENTS: NORMAL
COMMENTS: NORMAL

## 2024-07-29 ASSESSMENT — EXTERNAL EXAM - RIGHT EYE: OD_EXAM: NORMAL

## 2024-07-29 ASSESSMENT — EXTERNAL EXAM - LEFT EYE: OS_EXAM: NORMAL

## 2024-07-29 NOTE — NURSING NOTE
Chief Complaints and History of Present Illnesses   Patient presents with    Uveitis Follow-Up     Chronic anterior uveitis of both eyes      Chief Complaint(s) and History of Present Illness(es)       Uveitis Follow-Up              Laterality: both eyes    Associated symptoms: dryness, eye pain, headache, photophobia and floaters.  Negative for tearing and flashes    Treatments tried: eye drops    Pain scale: 4/10    Comments: Chronic anterior uveitis of both eyes               Comments    Pt states vision is the same.  Pain 4/10 constantly.  No flashes.  No change to floaters.  Light sensitive, headaches, and some dryness.    Dorzolamide-Timolol 3x/day in each eye   Brimonidine 3x/day BE  Prednisone 5 mg daily  Prednisolone BE daily  Methotrexate 20 mg (8 pills)  weekly  Folic acid 2mg every day and 3mg day before and the day after methotrexate  Humira 40 mg every 14 days.    TERESA Morelos July 29, 2024 3:26 PM

## 2024-07-29 NOTE — LETTER
7/29/2024       RE: Glory Oswald  [address] [facility] [address]     Dear Colleague,    Thank you for referring your patient, Glory Oswald, to the [facility] [place] at [facility]. Please see a copy of my visit note below.    Chief Complaint/Presenting Concern:  Uveitis follow up.    Interval History of Present Ocular Illness:  Glory Oswald is a 28 year old patient who returns for follow up of her uveitis. Last visit, things were doing better in the front but pressure was elevated. We modified the drops slightly.     Glory reports pain in each eye, mostly the right eye but also the left eye.     Interval Updates to Medical/Family/Social History:    No changes based from Dr. Doyle  2. Recent work changes have been a little stressful, but otherwise okay.     Relevant Review of Systems Updates:  Still having brain fog and fatigue. Mild sinus congestion but no illnesses    Labs: None new    Current eye related medications:   Drops: Dorzolamide-timolol 3x/day both eyes, Brimonidine 3x/day each eye, Prednisolone 1x/day each eye  Systemic: Humira 40 mg every 40 mg every 14 days (Now 8 doses so far), Oral Methotrexate 20 mg weekly, Folic acid 2mg every day and 3mg day before, day of ,and day after the day after methotrexate, Prednisone tablets 5 mg every day (plans to go down to 2.5 mg today)     No Imaging today     Assessment:   1. Chronic anterior uveitis of both eyes  Improving     2. Intermediate uveitis of both eyes  Mild haze left eye only     3. Bilateral ocular hypertension  Improved     4. High risk medication use  Humira 40 mg every 40 mg every 14 days, Oral Methotrexate 20 mg weekly, Folic acid 2mg every day and 3mg day before, day of ,and day after the day after methotrexate,  Prednisone tablets 5 mg every day (plans to go down to 2.5 mg today)    Plan/Recommendations:    Discussed findings with patient and her .  We are ready to taper down on prednisone pills and update glasses.  Eye pressure is 20, 10. This has improved.   Recommend additional testing when scheduled with Dr. Doyle.  Continue the drops unchanged: Dorzolamide-timolol 3x/day both eyes, Brimonidine 3x/day each eye, Prednisolone 1x/day each eye.  Continue these treatments unchanged:  Humira 40 mg every 40 mg every 14 days, Oral Methotrexate 20 mg weekly, Folic acid 2mg every day and 3mg day before, day of ,and day after the day after Methotrexate.  For the prednisone tablets, take 2.5 mg each AM for 4 weeks (last dose 8/26/24) then stop.  No new treatments.  Updated prescription for glasses.    RTC week of 9/9 tonopen, AC Check, then dilate, OCT (ordered)    Physician Attestation    Attending Physician Attestation:  Complete documentation of historical and exam elements from today's encounter can be found in the full encounter summary report (not reduplicated in this progress note). I personally obtained the chief complaint(s) and history of present illness. I confirmed and edited as necessary the review of systems, past medical/surgical history, family history, social history, and examination findings as documented by others; and I examined the patient myself. I personally reviewed the relevant tests, images, and reports as documented above. I formulated and edited as necessary the assessment and plan and discussed the findings and management plan with the patient and family members present at the time of this visit.  Ricky Dior M.D., Uveitis and Medical Retina, July 29, 2024       Again, thank you for allowing me to participate in the care of your patient.      Sincerely,    Ricky Dior MD  Uveitis and Medical Retina    Department of Ophthalmology & Visual Neurosciences  [facility]

## 2024-07-29 NOTE — PATIENT INSTRUCTIONS
Continue the drops unchanged: Dorzolamide-timolol 3x/day both eyes, Brimonidine 3x/day each eye, Prednisolone 1x/day each eye  Continue these treatments unchanged:  Humira 40 mg every 40 mg every 14 days, Oral Methotrexate 20 mg weekly, Folic acid 2mg every day and 3mg day before, day of ,and day after the day after methotrexate  For the prednisone tablets, take 2.5 mg each AM for 4 weeks (last dose 8/26/24) then stop  No new treatments  Updated prescription for glasses  Try more artificial tears to see if this can help the eye discomfort.

## 2024-08-09 ENCOUNTER — MYC REFILL (OUTPATIENT)
Dept: OPHTHALMOLOGY | Facility: CLINIC | Age: 29
End: 2024-08-09
Payer: COMMERCIAL

## 2024-08-09 DIAGNOSIS — H40.053 BILATERAL OCULAR HYPERTENSION: ICD-10-CM

## 2024-08-09 RX ORDER — DORZOLAMIDE HYDROCHLORIDE AND TIMOLOL MALEATE 20; 5 MG/ML; MG/ML
1 SOLUTION/ DROPS OPHTHALMIC 3 TIMES DAILY
Qty: 10 ML | Refills: 5 | Status: CANCELLED | OUTPATIENT
Start: 2024-08-09

## 2024-08-09 NOTE — TELEPHONE ENCOUNTER
Phoned [facility]. Verified that they did receive the updated Cosopt prescription on 7/8/24 from Dr. Dior.     Message will be sent to patient to notify them that pharmacy does have new prescription with refills.     Belén Ford RN 12:26 PM 08/09/24

## 2024-08-13 ENCOUNTER — MYC REFILL (OUTPATIENT)
Dept: OPHTHALMOLOGY | Facility: CLINIC | Age: 29
End: 2024-08-13
Payer: COMMERCIAL

## 2024-08-13 DIAGNOSIS — H40.053 BILATERAL OCULAR HYPERTENSION: ICD-10-CM

## 2024-08-13 RX ORDER — DORZOLAMIDE HYDROCHLORIDE AND TIMOLOL MALEATE 20; 5 MG/ML; MG/ML
1 SOLUTION/ DROPS OPHTHALMIC 3 TIMES DAILY
Qty: 10 ML | Refills: 5 | Status: CANCELLED | OUTPATIENT
Start: 2024-08-13

## 2024-08-22 ENCOUNTER — TELEPHONE (OUTPATIENT)
Dept: OPHTHALMOLOGY | Facility: CLINIC | Age: 29
End: 2024-08-22
Payer: COMMERCIAL

## 2024-08-22 NOTE — TELEPHONE ENCOUNTER
Prior Authorization Approval    Medication: HUMIRA *CF* PEN 40 MG/0.4ML SC PNKT  Authorization Effective Date: 7/23/2024  Authorization Expiration Date: 8/22/2025  Approved Dose/Quantity: 2 kit / 28 day  Reference #: ANA (Key: [id number])   Insurance Company: [employer] - Phone [phone] Fax [phone]  Expected CoPay: $    CoPay Card Available: Other (see comments)    Financial Assistance Needed: [email or web address]  Which Pharmacy is filling the prescription: [address]  Pharmacy Notified: renewal  Patient Notified: renewal        Thank You,     Clair [facility]  Specialty Pharmacy Clinic [facility] Specialty  [email or web address]  [email or web address]  Phone: [phone]  Fax: [phone]

## 2024-08-22 NOTE — TELEPHONE ENCOUNTER
PA Initiation    Medication: HUMIRA *CF* PEN 40 MG/0.4ML SC PNKT  Insurance Company: [employer] - Phone [phone] Fax [phone]  Pharmacy Filling the Rx: [place], [place] - [address] [place] [address]  Filling Pharmacy Phone: [phone]  Filling Pharmacy Fax: [phone]  Start Date: 8/22/2024  ANA (Key: [id number])  PA Case ID #: [id number]        Thank You,     Clair Rose [facility]  Specialty Pharmacy Clinic [facility] Specialty  [email or web address]  [email or web address]  Phone: [phone]  Fax: [phone]

## 2024-09-09 ENCOUNTER — OFFICE VISIT (OUTPATIENT)
Dept: OPHTHALMOLOGY | Facility: CLINIC | Age: 29
End: 2024-09-09
Attending: OPHTHALMOLOGY
Payer: COMMERCIAL

## 2024-09-09 DIAGNOSIS — H30.23 INTERMEDIATE UVEITIS OF BOTH EYES: ICD-10-CM

## 2024-09-09 DIAGNOSIS — H40.053 BILATERAL OCULAR HYPERTENSION: ICD-10-CM

## 2024-09-09 DIAGNOSIS — H35.352 CYSTOID MACULAR EDEMA OF LEFT EYE: ICD-10-CM

## 2024-09-09 DIAGNOSIS — Z79.899 HIGH RISK MEDICATION USE: ICD-10-CM

## 2024-09-09 DIAGNOSIS — H20.13 CHRONIC ANTERIOR UVEITIS OF BOTH EYES: Primary | ICD-10-CM

## 2024-09-09 PROCEDURE — 99213 OFFICE O/P EST LOW 20 MIN: CPT | Performed by: OPHTHALMOLOGY

## 2024-09-09 PROCEDURE — 99214 OFFICE O/P EST MOD 30 MIN: CPT | Performed by: OPHTHALMOLOGY

## 2024-09-09 PROCEDURE — 92134 CPTRZ OPH DX IMG PST SGM RTA: CPT | Performed by: OPHTHALMOLOGY

## 2024-09-09 RX ORDER — DORZOLAMIDE HYDROCHLORIDE AND TIMOLOL MALEATE 20; 5 MG/ML; MG/ML
1-2 SOLUTION/ DROPS OPHTHALMIC 3 TIMES DAILY
Qty: 10 ML | Refills: 5 | Status: SHIPPED | OUTPATIENT
Start: 2024-09-09

## 2024-09-09 RX ORDER — BRIMONIDINE TARTRATE 2 MG/ML
1-2 SOLUTION/ DROPS OPHTHALMIC 3 TIMES DAILY
Qty: 15 ML | Refills: 5 | Status: SHIPPED | OUTPATIENT
Start: 2024-09-09

## 2024-09-09 ASSESSMENT — TONOMETRY
IOP_METHOD: TONOPEN
OD_IOP_MMHG: 24
OS_IOP_MMHG: 18
IOP_METHOD: APPLANATION
OD_IOP_MMHG: 26

## 2024-09-09 ASSESSMENT — VISUAL ACUITY
OD_SC: 20/25
OS_PH_SC: 20/25
OD_SC+: -2
OS_SC: 20/40
METHOD: SNELLEN - LINEAR

## 2024-09-09 ASSESSMENT — CONF VISUAL FIELD
OS_SUPERIOR_NASAL_RESTRICTION: 0
OS_INFERIOR_TEMPORAL_RESTRICTION: 0
OS_SUPERIOR_TEMPORAL_RESTRICTION: 0
OD_INFERIOR_TEMPORAL_RESTRICTION: 0
OD_SUPERIOR_TEMPORAL_RESTRICTION: 0
OD_SUPERIOR_NASAL_RESTRICTION: 0
OS_NORMAL: 1
OS_INFERIOR_NASAL_RESTRICTION: 0
OD_INFERIOR_NASAL_RESTRICTION: 0
METHOD: COUNTING FINGERS
OD_NORMAL: 1

## 2024-09-09 ASSESSMENT — EXTERNAL EXAM - RIGHT EYE: OD_EXAM: NORMAL

## 2024-09-09 ASSESSMENT — SLIT LAMP EXAM - LIDS
COMMENTS: NORMAL
COMMENTS: NORMAL

## 2024-09-09 ASSESSMENT — CUP TO DISC RATIO
OS_RATIO: 0.25
OD_RATIO: 0.2

## 2024-09-09 ASSESSMENT — EXTERNAL EXAM - LEFT EYE: OS_EXAM: NORMAL

## 2024-09-09 NOTE — PROGRESS NOTES
Chief Complaint/Presenting Concern:  Uveitis follow up    Interval History of Present Ocular Illness:  Glory Oswald is a 28 year old patient who returns for follow up of her uveitis. Last visit, we elected to taper prednisone.     Glory reports things are doing well off prednisone for over one week. Has prescription but not yet able to get glasses. Ran out of brimonidine two days ago.     Interval Updates to Medical/Family/Social History:    Work has been busy with even fewer staff but has been managing okay.  Now on Humira biosimilar and got nauseous the day of first injection and leg was very sore. This is also a faster injection speed with the injector     Relevant Review of Systems Updates:  Some heartburn recently for 4-5 days in a row and some stomach discomfort. Will see Primary Doctor tomorrow as this has not gone away.    Labs: None recently      Current eye related medications:   Drops: Dorzolamide-timolol 3x/day both eyes, Brimonidine 3x/day each eye, Prednisolone 1x/day each eye  Systemic: Adalimumab-ryvk (Humira) Biosimilar 40 mg every 40 mg every 14 days, Oral Methotrexate 20 mg weekly, Folic acid 2mg every day and 3mg day before, day of ,Now off prednisone since 8/26/24    Retina/Uveitis Imaging:   OCT Spectralis Macula September 9, 2024  right eye: Improved vitreous opacities, normal contour, no fluid. No NFL thickening, no PP fluid  left eye: Improved media, normal contour, no fluid. No NFL thickening, no PP fluid    Assessment:     1. Chronic anterior uveitis of both eyes  Only few cells in each eye     2. Intermediate uveitis of both eyes  Mild haze in each eye     3. Cystoid macular edema of left eye  None in either eye    4. Bilateral ocular hypertension  Slightly elevated right eye off brimonidine few days    5. High risk medication use  Adalimumab-ryvk  Humira Biosimilar 40 mg every 40 mg every 14 days, Oral Methotrexate 20 mg weekly, Now off prednisone    Plan/Recommendations:    Discussed findings with patient and her . The front of each eye are doing well with only a few cells in each eye off prednisone for nearly 2 weeks. There have been some Humira biosimilar side effects but not too bad. We will continue with methotrexate and can stay off prednisone pills.   Eye pressure is 25, 18.We will continue drops   Recommend additional testing: CBC, CMP next week   Continue drops for pressure lowering: Dorzolamide-timolol 3x/day both eyes, Brimonidine 3x/day each eye until 9/30/24. Then starting on 10/1/24, reduce Dorzolamide-Timolol to 2x/day in each eye and Brimonidine to 2x/day in each eye until next visit  For the prednisolone drop, let's continue once daily in each eye until 9/30/24. Then beginning 10/1/24, then reduce to every other day in each eye until next visit  Continue the same doses of these medicines: Oral Methotrexate 20 mg weekly, Folic acid 2mg every day and 3mg day before, day of ,  Regarding Adalimumab-Ryvk (Humira) biosimilar, let's see how things do with the current one every 14 days.     RTC 10/21 or 10/28 Tonopen, AC check, then dilate, no testing    Physician Attestation     Attending Physician Attestation:  Complete documentation of historical and exam elements from today's encounter can be found in the full encounter summary report (not reduplicated in this progress note). I personally obtained the chief complaint(s) and history of present illness. I confirmed and edited as necessary the review of systems, past medical/surgical history, family history, social history, and examination findings as documented by others; and I examined the patient myself. I personally reviewed the relevant tests, images, and reports as documented above. I formulated and edited as necessary the assessment and plan and discussed the findings and management plan with the patient and family members present at the time of this visit.  Ricky Dior M.D., Uveitis and Medical Retina,  September 9, 2024

## 2024-09-09 NOTE — NURSING NOTE
Chief Complaints and History of Present Illnesses   Patient presents with    Uveitis Follow-Up     Chief Complaint(s) and History of Present Illness(es)       Uveitis Follow-Up              Laterality: both eyes    Onset: gradual    Onset: months ago    Quality: States va is the same since last visit      Severity: moderate    Frequency: intermittently    Associated symptoms: dryness, eye pain (started last week), photophobia (more at night), floaters and itching.  Negative for redness, tearing and flashes    Treatments tried: eye drops    Pain scale: 3/10              Comments    Here for chronic anterior uveitis of both eyes  Dorzolamide-timolol 3x/day both eyes  Brimonidine last taken 2 days ago  Prednisolone 1x/day each eye  Humira   Methotrexate   Folic acid  Prednisone last taken Aug 26th  Yadira Barber COT 3:43 PM September 9, 2024

## 2024-09-09 NOTE — LETTER
9/9/2024       RE: Glory Oswald  [address] [facility] [id number]     Dear Colleague,    Thank you for referring your patient, Glory Oswald, to the [facility] [place] at [facility]. Please see a copy of my visit note below.    Chief Complaint/Presenting Concern:  Uveitis follow up.    Interval History of Present Ocular Illness:  Glory Oswald is a 28 year old patient who returns for follow up of her uveitis. Last visit, we elected to taper prednisone.     Glory reports things are doing well off prednisone for over one week. Has prescription but not yet able to get glasses. Ran out of brimonidine two days ago.     Interval Updates to Medical/Family/Social History:    Work has been busy with even fewer staff but has been managing okay.  Now on Humira biosimilar and got nauseous the day of first injection and leg was very sore. This is also a faster injection speed with the injector     Relevant Review of Systems Updates:  Some heartburn recently for 4-5 days in a row and some stomach discomfort. Will see Primary Doctor tomorrow as this has not gone away.    Labs: None recently      Current eye related medications:   Drops: Dorzolamide-timolol 3x/day both eyes, Brimonidine 3x/day each eye, Prednisolone 1x/day each eye  Systemic: Adalimumab-ryvk (Humira) Biosimilar 40 mg every 40 mg every 14 days, Oral Methotrexate 20 mg weekly, Folic acid 2mg every day and 3mg day before, day of ,Now off prednisone since 8/26/24    Retina/Uveitis Imaging:   OCT Spectralis Macula September 9, 2024  right eye: Improved vitreous opacities, normal contour, no fluid. No NFL thickening, no PP fluid  left eye: Improved media, normal contour, no fluid. No NFL thickening, no PP fluid    Assessment:     1. Chronic anterior uveitis of both eyes  Only few cells in each eye     2. Intermediate uveitis of both eyes  Mild haze in each eye     3. Cystoid macular  edema of left eye  None in either eye    4. Bilateral ocular hypertension  Slightly elevated right eye off brimonidine few days    5. High risk medication use  Adalimumab-ryvk  Humira Biosimilar 40 mg every 40 mg every 14 days, Oral Methotrexate 20 mg weekly, Now off prednisone    Plan/Recommendations:   Discussed findings with patient and her . The front of each eye are doing well with only a few cells in each eye off prednisone for nearly 2 weeks. There have been some Humira biosimilar side effects but not too bad. We will continue with methotrexate and can stay off prednisone pills.   Eye pressure is 25, 18.We will continue drops.   Recommend additional testing: CBC, CMP next week.  Continue drops for pressure lowering: Dorzolamide-timolol 3x/day both eyes, Brimonidine 3x/day each eye until 9/30/24. Then starting on 10/1/24, reduce Dorzolamide-Timolol to 2x/day in each eye and Brimonidine to 2x/day in each eye until next visit.  For the prednisolone drop, let's continue once daily in each eye until 9/30/24. Then beginning 10/1/24, then reduce to every other day in each eye until next visit.  Continue the same doses of these medicines: Oral Methotrexate 20 mg weekly, Folic acid 2mg every day and 3mg day before, day of.  Regarding Adalimumab-Ryvk (Humira) biosimilar, let's see how things do with the current one every 14 days.     RTC 10/21 or 10/28 Tonopen, AC check, then dilate, no testing    Physician Attestation    Attending Physician Attestation:  Complete documentation of historical and exam elements from today's encounter can be found in the full encounter summary report (not reduplicated in this progress note). I personally obtained the chief complaint(s) and history of present illness. I confirmed and edited as necessary the review of systems, past medical/surgical history, family history, social history, and examination findings as documented by others; and I examined the patient myself. I  personally reviewed the relevant tests, images, and reports as documented above. I formulated and edited as necessary the assessment and plan and discussed the findings and management plan with the patient and family members present at the time of this visit.  Ricky Dior M.D., Uveitis and Medical Retina, September 9, 2024       Again, thank you for allowing me to participate in the care of your patient.      Sincerely,    Ricky Dior MD  Uveitis and Medical Retina    Department of Ophthalmology & Visual Neurosciences  [facility]

## 2024-09-09 NOTE — PATIENT INSTRUCTIONS
Continue pressure lowering drops: Dorzolamide-timolol 3x/day both eyes, Brimonidine 3x/day each eye until 9/30/24. Then starting on 10/1/24, reduce Dorzolamide-Timolol to 2x/day in each eye and Brimonidine to 2x/day in each eye until next visit  For the prednisolone drop, let's continue once daily in each eye until 9/30/24. Then beginning 10/1/24, then reduce to every other day in each eye until next visit  Continue the same doses of these medicines: Oral Methotrexate 20 mg weekly, Folic acid 2mg every day and 3mg day before, day of ,  Regarding Adalimumab-Ryvk (Humira) biosimilar, let's see how things do with the current one every 14 days.  No prednisone pills

## 2024-10-21 ENCOUNTER — OFFICE VISIT (OUTPATIENT)
Dept: OPHTHALMOLOGY | Facility: CLINIC | Age: 29
End: 2024-10-21
Attending: OPHTHALMOLOGY
Payer: COMMERCIAL

## 2024-10-21 DIAGNOSIS — H20.13 CHRONIC ANTERIOR UVEITIS OF BOTH EYES: Primary | ICD-10-CM

## 2024-10-21 DIAGNOSIS — Z79.899 HIGH RISK MEDICATION USE: ICD-10-CM

## 2024-10-21 DIAGNOSIS — H40.053 BILATERAL OCULAR HYPERTENSION: ICD-10-CM

## 2024-10-21 DIAGNOSIS — H30.23 INTERMEDIATE UVEITIS OF BOTH EYES: ICD-10-CM

## 2024-10-21 PROCEDURE — 92014 COMPRE OPH EXAM EST PT 1/>: CPT | Performed by: OPHTHALMOLOGY

## 2024-10-21 PROCEDURE — 99214 OFFICE O/P EST MOD 30 MIN: CPT | Performed by: OPHTHALMOLOGY

## 2024-10-21 ASSESSMENT — CUP TO DISC RATIO
OS_RATIO: 0.25
OD_RATIO: 0.2

## 2024-10-21 ASSESSMENT — EXTERNAL EXAM - RIGHT EYE: OD_EXAM: NORMAL

## 2024-10-21 ASSESSMENT — REFRACTION_WEARINGRX
OD_SPHERE: -0.25
OS_CYLINDER: +0.25
OD_CYLINDER: SPHERE
OS_SPHERE: -0.75
OS_AXIS: 045

## 2024-10-21 ASSESSMENT — CONF VISUAL FIELD
METHOD: COUNTING FINGERS
OD_NORMAL: 1
OD_SUPERIOR_NASAL_RESTRICTION: 0
OS_NORMAL: 1
OS_SUPERIOR_NASAL_RESTRICTION: 0
OS_INFERIOR_NASAL_RESTRICTION: 0
OD_INFERIOR_TEMPORAL_RESTRICTION: 0
OD_INFERIOR_NASAL_RESTRICTION: 0
OS_INFERIOR_TEMPORAL_RESTRICTION: 0
OD_SUPERIOR_TEMPORAL_RESTRICTION: 0
OS_SUPERIOR_TEMPORAL_RESTRICTION: 0

## 2024-10-21 ASSESSMENT — VISUAL ACUITY
OS_PH_SC: 20/30
OD_SC: 20/30
OD_SC+: +1
OS_SC: 20/40
METHOD: SNELLEN - LINEAR

## 2024-10-21 ASSESSMENT — EXTERNAL EXAM - LEFT EYE: OS_EXAM: NORMAL

## 2024-10-21 ASSESSMENT — TONOMETRY
OS_IOP_MMHG: 17
OD_IOP_MMHG: 24
IOP_METHOD: TONOPEN

## 2024-10-21 ASSESSMENT — SLIT LAMP EXAM - LIDS
COMMENTS: NORMAL
COMMENTS: NORMAL

## 2024-10-21 NOTE — PROGRESS NOTES
Chief Complaint/Presenting Concern:  Uveitis follow up    Interval History of Present Ocular Illness:  Glory Oswald is a 28 year old patient who returns for follow up of her chronic anterior uveitis and intermediate uveitis of each eye. Last visit we were looking forward to more time on systemic therapy and modified drops.    Glory feels the left eye is perhaps a bit burry. She is wondering if this has to do with not getting glasses    Interval Updates to Medical/Family/Social History:    Recovered from emergency cholecystectomy for large gallstone. Did have to hold one dose of methotrexate and then Humira was delayed by one week.    Relevant Review of Systems Updates:    Still some abdominal discomfort  Recently figured out that she had a latex allergy possibly from Humira. A bump happened and then without bandaids and with allergy pill, things have been fine.     Labs: 10/2/24:Just slightly elevated AST 42, CBC Normal     Current eye related medications:   Drops: Dorzolamide-timolol 2x/day both eyes, Brimonidine 2x/day each eye, Prednisolone every other day starting last week  Systemic: Adalimumab-ryvk (Humira) Biosimilar 40 mg every 40 mg every 14 days, Oral Methotrexate 20 mg weekly, Folic acid 2mg every day and 3mg day before    Retina/Uveitis Imaging: None today    Assessment:     1. Chronic anterior uveitis of both eyes  Few cells in the right eye only    2. Intermediate uveitis of both eyes  Mild haze    3. Bilateral ocular hypertension  Just slightly elevated right eye     4. High risk medication use  Adalimumab-ryvk (Humira) Biosimilar 40 mg every 40 mg every 14 days, Oral Methotrexate 20 mg weekly, Folic acid 2mg every day and 3mg day before    Plan/Recommendations:    Discussed findings with patient and her . The eyes are doing well with only a few cells in the front of each eye. The back of the eyes also look well. As below, we will continue to taper steroid drops  The lenses in each eye  are getting a bit cloudy, but hopefully the reduction of steroid drops and new glasses will help   Eye pressure is 24,17. This is still slightly elevated right eye but better on lower frequencies of drops  Recommend additional testing: None needed at this time  Continue these medications: Adalimumab-ryvk (Humira) Biosimilar 40 mg every 40 mg every 14 days, Oral Methotrexate 20 mg weekly, Folic acid 2mg every day and 3mg day before  No oral steroid  For the prednisolone drops, let's do every other day for two weeks then stop  For the pressure lowering drops, let's continue  Dorzolamide-timolol 2x/day both eyes, Brimonidine 2x/day each eye for two more weeks. Then after 2 weeks, stop Blue top and just continue purple top Brimonidine 2x/day in each eye  Okay to get updated glasses    RTC Early December applanate, AC Check, then dilate, OCT, RNFL (ordered)    Physician Attestation     Attending Physician Attestation:  Complete documentation of historical and exam elements from today's encounter can be found in the full encounter summary report (not reduplicated in this progress note). I personally obtained the chief complaint(s) and history of present illness. I confirmed and edited as necessary the review of systems, past medical/surgical history, family history, social history, and examination findings as documented by others; and I examined the patient myself. I personally reviewed the relevant tests, images, and reports as documented above. I formulated and edited as necessary the assessment and plan and discussed the findings and management plan with the patient and family members present at the time of this visit.  Ricky Dior M.D., Uveitis and Medical Retina, October 21, 2024

## 2024-10-21 NOTE — NURSING NOTE
"Chief Complaints and History of Present Illnesses   Patient presents with    Uveitis Follow-Up     1 month follow up.   1. Chronic anterior uveitis of both eyes  2. Intermediate uveitis of both eyes  3. Cystoid macular edema of left eye  4. Bilateral ocular hypertension  5. High risk medication use    Patient notes her vision is a little more blurry each eye over the last month. \"I had my gallbladder out after I was here last.\" Patient notes her eyes get tired.                  Chief Complaint(s) and History of Present Illness(es)       Uveitis Follow-Up              Laterality: both eyes    Onset: gradual    Onset: months ago    Quality: blurred vision    Course: gradually worsening    Associated symptoms: dryness (allergy related), photophobia (especially at night), floaters (little black spots OU) and itching (allergy related).  Negative for eye pain, redness and flashes    Treatments tried: eye drops    Pain scale: 0/10    Comments: 1 month follow up.   1. Chronic anterior uveitis of both eyes  2. Intermediate uveitis of both eyes  3. Cystoid macular edema of left eye  4. Bilateral ocular hypertension  5. High risk medication use    Patient notes her vision is a little more blurry each eye over the last month. \"I had my gallbladder out after I was here last.\" Patient notes her eyes get tired.                           Comments    Ocular meds:   - Dorzolamide/Timolol BID each eye   - Brimonidine BID each eye   - Prednisolone every other day each eye, \"I started a week later than I was supposed to.\"   - Methotrexate 20mg weekly  - Folic acid 2mg daily and 3mg day before and day of  - Adalimumab-Ryvk Q14 days     TERESA Clemons 1:31 PM 10/21/2024                     "

## 2024-10-21 NOTE — LETTER
10/21/2024       RE: Glory Oswald  [address] [facility] [address]     Dear Colleague,    Thank you for referring your patient, Glory Oswald, to the [facility] [place] at [facility]. Please see a copy of my visit note below.    Chief Complaint/Presenting Concern:  Uveitis follow up    Interval History of Present Ocular Illness:  Glory Oswald is a 28 year old patient who returns for follow up of her chronic anterior uveitis and intermediate uveitis of each eye. Last visit we were looking forward to more time on systemic therapy and modified drops.    Glory feels the left eye is perhaps a bit burry. She is wondering if this has to do with not getting glasses    Interval Updates to Medical/Family/Social History:    Recovered from emergency cholecystectomy for large gallstone. Did have to hold one dose of methotrexate and then Humira was delayed by one week.    Relevant Review of Systems Updates:    Still some abdominal discomfort  Recently figured out that she had a latex allergy possibly from Humira. A bump happened and then without bandaids and with allergy pill, things have been fine.     Labs: 10/2/24:Just slightly elevated AST 42, CBC Normal     Current eye related medications:   Drops: Dorzolamide-timolol 2x/day both eyes, Brimonidine 2x/day each eye, Prednisolone every other day starting last week  Systemic: Adalimumab-ryvk (Humira) Biosimilar 40 mg every 40 mg every 14 days, Oral Methotrexate 20 mg weekly, Folic acid 2mg every day and 3mg day before    Retina/Uveitis Imaging: None today    Assessment:     1. Chronic anterior uveitis of both eyes  Few cells in the right eye only    2. Intermediate uveitis of both eyes  Mild haze    3. Bilateral ocular hypertension  Just slightly elevated right eye     4. High risk medication use  Adalimumab-ryvk (Humira) Biosimilar 40 mg every 40 mg every 14 days, Oral Methotrexate 20 mg  weekly, Folic acid 2mg every day and 3mg day before    Plan/Recommendations:    Discussed findings with patient and her . The eyes are doing well with only a few cells in the front of each eye. The back of the eyes also look well. As below, we will continue to taper steroid drops  The lenses in each eye are getting a bit cloudy, but hopefully the reduction of steroid drops and new glasses will help   Eye pressure is 24,17. This is still slightly elevated right eye but better on lower frequencies of drops  Recommend additional testing: None needed at this time  Continue these medications: Adalimumab-ryvk (Humira) Biosimilar 40 mg every 40 mg every 14 days, Oral Methotrexate 20 mg weekly, Folic acid 2mg every day and 3mg day before  No oral steroid  For the prednisolone drops, let's do every other day for two weeks then stop  For the pressure lowering drops, let's continue  Dorzolamide-timolol 2x/day both eyes, Brimonidine 2x/day each eye for two more weeks. Then after 2 weeks, stop Blue top and just continue purple top Brimonidine 2x/day in each eye  Okay to get updated glasses    RTC Early December applanate, AC Check, then dilate, OCT, RNFL (ordered)    Physician Attestation    Attending Physician Attestation:  Complete documentation of historical and exam elements from today's encounter can be found in the full encounter summary report (not reduplicated in this progress note). I personally obtained the chief complaint(s) and history of present illness. I confirmed and edited as necessary the review of systems, past medical/surgical history, family history, social history, and examination findings as documented by others; and I examined the patient myself. I personally reviewed the relevant tests, images, and reports as documented above. I formulated and edited as necessary the assessment and plan and discussed the findings and management plan with the patient and family members present at the time of this  visit.  Ricky Dior M.D., Uveitis and Medical Retina, October 21, 2024     Again, thank you for allowing me to participate in the care of your patient.      Sincerely,    Ricky Dior MD  Uveitis and Medical Retina    Department of Ophthalmology & Visual Neurosciences  [facility]

## 2024-10-21 NOTE — PATIENT INSTRUCTIONS
Continue these medications: Adalimumab-ryvk (Humira) Biosimilar 40 mg every 40 mg every 14 days, Oral Methotrexate 20 mg weekly, Folic acid 2mg every day and 3mg day before  No oral steroid  For the prednisolone drops, let's do every other day for two weeks then stop  For the pressure lowering drops, let's continue  Dorzolamide-timolol 2x/day both eyes, Brimonidine 2x/day each eye for two more weeks. Then after 2 weeks, stop Blue top and just continue purple top Brimonidine 2x/day in each eye  Okay to get updated glasses

## 2024-12-02 ENCOUNTER — OFFICE VISIT (OUTPATIENT)
Dept: OPHTHALMOLOGY | Facility: CLINIC | Age: 29
End: 2024-12-02
Attending: OPHTHALMOLOGY
Payer: COMMERCIAL

## 2024-12-02 DIAGNOSIS — H30.23 INTERMEDIATE UVEITIS OF BOTH EYES: ICD-10-CM

## 2024-12-02 DIAGNOSIS — H35.352 CYSTOID MACULAR EDEMA OF LEFT EYE: ICD-10-CM

## 2024-12-02 DIAGNOSIS — H40.053 BILATERAL OCULAR HYPERTENSION: ICD-10-CM

## 2024-12-02 DIAGNOSIS — H26.223 CATARACT IN INFLAMMATORY DISORDER, BILATERAL: ICD-10-CM

## 2024-12-02 DIAGNOSIS — Z79.899 HIGH RISK MEDICATION USE: ICD-10-CM

## 2024-12-02 DIAGNOSIS — H20.13 CHRONIC ANTERIOR UVEITIS OF BOTH EYES: Primary | ICD-10-CM

## 2024-12-02 PROCEDURE — 99213 OFFICE O/P EST LOW 20 MIN: CPT | Performed by: OPHTHALMOLOGY

## 2024-12-02 PROCEDURE — 92133 CPTRZD OPH DX IMG PST SGM ON: CPT | Performed by: OPHTHALMOLOGY

## 2024-12-02 PROCEDURE — 92134 CPTRZ OPH DX IMG PST SGM RTA: CPT | Performed by: OPHTHALMOLOGY

## 2024-12-02 RX ORDER — BRIMONIDINE TARTRATE 2 MG/ML
1 SOLUTION/ DROPS OPHTHALMIC 2 TIMES DAILY
Qty: 15 ML | Refills: 5 | Status: SHIPPED | OUTPATIENT
Start: 2024-12-02

## 2024-12-02 ASSESSMENT — CONF VISUAL FIELD
METHOD: COUNTING FINGERS
OD_SUPERIOR_TEMPORAL_RESTRICTION: 0
OS_SUPERIOR_TEMPORAL_RESTRICTION: 0
OD_INFERIOR_TEMPORAL_RESTRICTION: 0
OD_INFERIOR_NASAL_RESTRICTION: 0
OD_SUPERIOR_NASAL_RESTRICTION: 0
OS_NORMAL: 1
OD_NORMAL: 1
OS_INFERIOR_NASAL_RESTRICTION: 0
OS_SUPERIOR_NASAL_RESTRICTION: 0
OS_INFERIOR_TEMPORAL_RESTRICTION: 0

## 2024-12-02 ASSESSMENT — CUP TO DISC RATIO
OD_RATIO: 0.2
OS_RATIO: 0.25

## 2024-12-02 ASSESSMENT — SLIT LAMP EXAM - LIDS
COMMENTS: NORMAL
COMMENTS: NORMAL

## 2024-12-02 ASSESSMENT — EXTERNAL EXAM - LEFT EYE: OS_EXAM: NORMAL

## 2024-12-02 ASSESSMENT — VISUAL ACUITY
OD_SC+: +2
METHOD: SNELLEN - LINEAR
OS_SC: 20/40
OD_PH_SC: 20/25
OS_PH_SC: 20/30
OD_SC: 20/40

## 2024-12-02 ASSESSMENT — TONOMETRY
OS_IOP_MMHG: 14
OD_IOP_MMHG: 24
IOP_METHOD: APPLANATION

## 2024-12-02 ASSESSMENT — EXTERNAL EXAM - RIGHT EYE: OD_EXAM: NORMAL

## 2024-12-02 NOTE — PATIENT INSTRUCTIONS
Continue these medications unchanged:Adalimumab-ryvk (Humira) Biosimilar 40 mg every 14 days, Oral Methotrexate 20 mg weekly, Folic acid 2mg every day and 3mg day before methotrexate. You can try splitting the methotrexate the dose in two over 24 hours  Increase the purple top brimonidine  to 2x/day in each eye   No need for steroid drops or steroid pills.   Regarding the cataracts, if/when surgery is planned, we might consider a betamethasone steroid injection day of surgery and likely a short course of prednisone

## 2024-12-02 NOTE — PROGRESS NOTES
Chief Complaint/Presenting Concern:  Uveitis follow-up    Interval History of Present Ocular Illness:  Glory Oswald is a 28 year old patient who returns for follow up of her chronic anterior and intermediate uveitis. We last saw each other on 10/21/24 at which time things were doing well on Adalimumab biosimilar and methotrexate. We elected to taper off steroid drops and modify pressure lowering drops. No oral prednisone was recommended.    Glory elected against getting updated glasses prescription because she feels the cataracts are worsening.  Vision is particularly blurry at work but also when driving at night as well as during the day.     Interval Updates to Medical/Family/Social History:    Work going okay.   Getting ready for [employer] soon!   Biosimilar Humira and methotrexate going okay.     Relevant Review of Systems Updates:  Health fine, no abdominal pain.     No recent labs     Current eye related medications:   Systemic:Adalimumab-ryvk (Humira) Biosimilar 40 mg every 14 days, Oral Methotrexate 20 mg weekly, Folic acid 2mg every day and 3mg day before, no prednisone pills  Drops: Brimonidine 1x/day each eye, No prednisolone drops,no blue top Dorzolamide-Timolol     Retina/Uveitis Imaging:   OCT Spectralis Macula December 2, 2024  right eye: Slightly hazy view but normal foveal contour, no fluid.  Normal outer segment.  No nerve fiber layer thickening, no peripapillary fluid  left eye: Slightly hazy view but normal foveal contour, no fluid.  Normal outer segment.  No nerve fiber layer thickening, no peripapillary fluid    OCT Optic Nerve RNFL Spectralis December 2, 2024  right eye: Average thickness 114  m, improved thickening, no thinning  Left eye:Average thickness 114  m, improved thickening, no thinning    Assessment:     1. 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 Intermediate uveitis of both eyes  No active haze    3. Cystoid  macular edema of left eye  None in either eye    4. Bilateral ocular hypertension  Slightly elevated right eye only    5. Cataract in inflammatory disorder, bilateral  Progressing with increasing visual significance    6. High risk medication use  Adalimumab-ryvk (Humira) Biosimilar 40 mg every 40 mg every 14 days, Oral Methotrexate 20 mg weekly, Folic acid 2mg every day and 3mg day before, no prednisone pills    Plan/Recommendations:    Discussed findings with patient and her . Now off steroid drops for over one month, the right eye has no cell and only a few cells present in the left eye. Overall there has only been minimal inflammation in each eye for months.There is no disc edema, no retinal edema, and no active vitreous haze. We can monitor on the current systemic therapy without steroid drops or pills. Cataracts are progressing and more noticeable and we discussed a consultation with one of my partners  Eye pressure is 24, 14.  Optic nerve scans are without thinning.  We can continue brimonidine but increase the frequency to twice daily in each eye   Recommend additional testing when scheduled with Dr. Doyle  Continue these medications unchanged:Adalimumab-ryvk (Humira) Biosimilar 40 mg every 14 days, Oral Methotrexate 20 mg weekly, Folic acid 2mg every day and 3mg day before methotrexate. You can try splitting the methotrexate the dose in two over 24 hours  Increase the purple top brimonidine  to 2x/day in each eye   No need for steroid drops or steroid pills.   Regarding the cataracts, if/when surgery is planned, we might consider a betamethasone steroid injection day of surgery and likely a short course of prednisone    RTC Dr. Annamarie bates avail Cataract eval--was able to get appointment on Thurs, 12/4/24    Will coordinate follow up visits thereafter.    Physician Attestation     Attending Physician Attestation:  Complete documentation of historical and exam elements from today's encounter can be  found in the full encounter summary report (not reduplicated in this progress note). I personally obtained the chief complaint(s) and history of present illness. I confirmed and edited as necessary the review of systems, past medical/surgical history, family history, social history, and examination findings as documented by others; and I examined the patient myself. I personally reviewed the relevant tests, images, and reports as documented above. I formulated and edited as necessary the assessment and plan and discussed the findings and management plan with the patient and family members present at the time of this visit.  Ricky Dior M.D., Uveitis and Medical Retina, December 2, 2024

## 2024-12-02 NOTE — LETTER
12/2/2024       RE: Glory Oswald  [address] [address]     Dear Colleague,    Thank you for referring your patient, Glory Oswald, to the [facility] [place] at [facility]. Please see a copy of my visit note below.    Chief Complaint/Presenting Concern:  Uveitis follow-up    Interval History of Present Ocular Illness:  Glory Oswald is a 28 year old patient who returns for follow up of her chronic anterior and intermediate uveitis. We last saw each other on 10/21/24 at which time things were doing well on Adalimumab biosimilar and methotrexate. We elected to taper off steroid drops and modify pressure lowering drops. No oral prednisone was recommended.    Glory elected against getting updated glasses prescription because she feels the cataracts are worsening.  Vision is particularly blurry at work but also when driving at night as well as during the day.     Interval Updates to Medical/Family/Social History:    Work going okay.   Getting ready for choir performance soon!   Biosimilar Humira and methotrexate going okay.     Relevant Review of Systems Updates:  Health fine, no abdominal pain.     No recent labs     Current eye related medications:   Systemic:Adalimumab-ryvk (Humira) Biosimilar 40 mg every 14 days, Oral Methotrexate 20 mg weekly, Folic acid 2mg every day and 3mg day before, no prednisone pills  Drops: Brimonidine 1x/day each eye, No prednisolone drops,no blue top Dorzolamide-Timolol         Retina/Uveitis Imaging:   OCT Spectralis Macula December 2, 2024  right eye: Slightly hazy view but normal foveal contour, no fluid.  Normal outer segment.  No nerve fiber layer thickening, no peripapillary fluid  left eye: Slightly hazy view but normal foveal contour, no fluid.  Normal outer segment.  No nerve fiber layer thickening, no peripapillary fluid    OCT Optic Nerve RNFL Spectralis December 2, 2024  right  eye: Average thickness 114  m, improved thickening, no thinning  Left eye:Average thickness 114  m, improved thickening, no thinning    Assessment:     1. 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 Intermediate uveitis of both eyes  No active haze    3. Cystoid macular edema of left eye  None in either eye    4. Bilateral ocular hypertension  Slightly elevated right eye only    5. Cataract in inflammatory disorder, bilateral  Progressing with increasing visual significance    6. High risk medication use  Adalimumab-ryvk (Humira) Biosimilar 40 mg every 40 mg every 14 days, Oral Methotrexate 20 mg weekly, Folic acid 2mg every day and 3mg day before, no prednisone pills    Plan/Recommendations:    Discussed findings with patient and her . Now off steroid drops for over one month, the right eye has no cell and only a few cells present in the left eye. Overall there has only been minimal inflammation in each eye for months.There is no disc edema, no retinal edema, and no active vitreous haze. We can monitor on the current systemic therapy without steroid drops or pills. Cataracts are progressing and more noticeable and we discussed a consultation with one of my partners  Eye pressure is 24, 14.  Optic nerve scans are without thinning.  We can continue brimonidine but increase the frequency to twice daily in each eye   Recommend additional testing when scheduled with Dr. Doyle  Continue these medications unchanged:Adalimumab-ryvk (Humira) Biosimilar 40 mg every 14 days, Oral Methotrexate 20 mg weekly, Folic acid 2mg every day and 3mg day before methotrexate. You can try splitting the methotrexate the dose in two over 24 hours  Increase the purple top brimonidine  to 2x/day in each eye   No need for steroid drops or steroid pills.   Regarding the cataracts, if/when surgery is planned, we might consider a betamethasone steroid injection day of surgery and likely a  short course of prednisone    RTC Dr. De Luna soonest avail Cataract eval, JY same day, no testing on that day.     Physician Attestation    Attending Physician Attestation:  Complete documentation of historical and exam elements from today's encounter can be found in the full encounter summary report (not reduplicated in this progress note). I personally obtained the chief complaint(s) and history of present illness. I confirmed and edited as necessary the review of systems, past medical/surgical history, family history, social history, and examination findings as documented by others; and I examined the patient myself. I personally reviewed the relevant tests, images, and reports as documented above. I formulated and edited as necessary the assessment and plan and discussed the findings and management plan with the patient and family members present at the time of this visit.  Ricky Dior M.D., Uveitis and Medical Retina, December 2, 2024     Again, thank you for allowing me to participate in the care of your patient.      Sincerely,    Ricky Dior MD  Uveitis and Medical Retina    Department of Ophthalmology & Visual Neurosciences  [facility]

## 2024-12-02 NOTE — NURSING NOTE
Chief Complaints and History of Present Illnesses   Patient presents with    Uveitis Follow-Up     Chief Complaint(s) and History of Present Illness(es)       Uveitis Follow-Up              Laterality: both eyes    Onset: gradual    Onset: months ago    Quality: Va is blurry and there is a lot of glare    Severity: moderate    Frequency: intermittently    Associated symptoms: glare, dryness, eye pain, headache, photophobia, floaters and itching.  Negative for flashes    Treatments tried: eye drops    Pain scale: 4/10              Comments    Her for chronic anterior uveitis of both eyes  +pressure more the right eye   Brimonidine every day each eye   Humira  Methotrexate   Folic acid  Yadira Barber COT 12:15 PM December 2, 2024

## 2024-12-02 NOTE — LETTER
December 2, 2024      Re: Glory Oswald   1995    To Whom It May Concern:    This is to confirm that the above patient was seen on 12/2/2024.  Glory Oswald was asked to come for an urgent appointment with one of our other Doctors here on Thursday, 12/4/24. Thank you for allowing her to take time to come back so soon for this medically necessary appointment.     Thank you for your cooperation in this matter.  Please do not hesitate to contact me if you have any further questions.    Sincerely,      CODI GUEVARA

## 2024-12-05 ENCOUNTER — OFFICE VISIT (OUTPATIENT)
Dept: OPHTHALMOLOGY | Facility: CLINIC | Age: 29
End: 2024-12-05
Attending: OPHTHALMOLOGY
Payer: COMMERCIAL

## 2024-12-05 ENCOUNTER — TELEPHONE (OUTPATIENT)
Dept: OPHTHALMOLOGY | Facility: CLINIC | Age: 29
End: 2024-12-05

## 2024-12-05 DIAGNOSIS — D49.6 CEREBELLAR TUMOR (H): ICD-10-CM

## 2024-12-05 DIAGNOSIS — Z86.69 HISTORY OF PAPILLEDEMA: ICD-10-CM

## 2024-12-05 DIAGNOSIS — H40.053 BILATERAL OCULAR HYPERTENSION: ICD-10-CM

## 2024-12-05 DIAGNOSIS — H30.23 INTERMEDIATE UVEITIS OF BOTH EYES: ICD-10-CM

## 2024-12-05 DIAGNOSIS — H26.223 CATARACT IN INFLAMMATORY DISORDER, BILATERAL: Primary | ICD-10-CM

## 2024-12-05 PROCEDURE — 76519 ECHO EXAM OF EYE: CPT | Performed by: OPHTHALMOLOGY

## 2024-12-05 PROCEDURE — 92025 CPTRIZED CORNEAL TOPOGRAPHY: CPT | Performed by: OPHTHALMOLOGY

## 2024-12-05 ASSESSMENT — CONF VISUAL FIELD
OD_NORMAL: 1
OS_INFERIOR_TEMPORAL_RESTRICTION: 0
OS_SUPERIOR_TEMPORAL_RESTRICTION: 0
OD_INFERIOR_TEMPORAL_RESTRICTION: 0
OD_SUPERIOR_NASAL_RESTRICTION: 0
OD_SUPERIOR_TEMPORAL_RESTRICTION: 0
OS_INFERIOR_NASAL_RESTRICTION: 0
OS_SUPERIOR_NASAL_RESTRICTION: 0
METHOD: COUNTING FINGERS
OD_INFERIOR_NASAL_RESTRICTION: 0
OS_NORMAL: 1

## 2024-12-05 ASSESSMENT — CUP TO DISC RATIO
OD_RATIO: 0.2
OS_RATIO: 0.25

## 2024-12-05 ASSESSMENT — VISUAL ACUITY
OS_BAT_MED: 20/70
OS_SC: 20/50
OD_SC: 20/40
OD_BAT_MED: 20/70
OD_BAT_LOW: 20/50
OS_BAT_LOW: 20/60
OS_SC+: -1
METHOD: SNELLEN - LINEAR
METHOD_MR: DX.  NO CHARGE.
OD_BAT_HIGH: 20/100
OD_PH_SC: 20/30
OS_PH_SC: 20/30
OS_BAT_HIGH: 20/100

## 2024-12-05 ASSESSMENT — REFRACTION_MANIFEST
OS_AXIS: 045
OS_SPHERE: -1.00
OD_CYLINDER: SPHERE
OD_SPHERE: -0.75
OS_CYLINDER: +0.75

## 2024-12-05 ASSESSMENT — SLIT LAMP EXAM - LIDS
COMMENTS: NORMAL
COMMENTS: NORMAL

## 2024-12-05 ASSESSMENT — TONOMETRY
OD_IOP_MMHG: 22
IOP_METHOD: ICARE
OS_IOP_MMHG: 13

## 2024-12-05 ASSESSMENT — EXTERNAL EXAM - RIGHT EYE: OD_EXAM: NORMAL

## 2024-12-05 ASSESSMENT — EXTERNAL EXAM - LEFT EYE: OS_EXAM: NORMAL

## 2024-12-05 NOTE — TELEPHONE ENCOUNTER
Called patient to schedule surgery with Dr. De Luna     Spoke with: Glory     Date(s) of Surgery: 12/23/24 and 1/20/25    Patient aware of approximate arrival time: Yes at pre op nursing will call      Location of surgery: [facility]     Pre-Op H&P: [facility] at [facility]         Informed patient that they need to call to schedule pre-op H&P within 30 days of surgery date: Yes      Post-Op Appt Dates:   12/24/24 9:45am  12/31/24 9:30am   1/21/25 9:30am  1/28/25 9:30am   2/18/25 9:45       Discussed with patient pre-op RN will call 2-3 days prior to surgery with arrival time and instructions:  Yes       Standard Surgery Packet Sent: Yes 12/05/24  via [employer] Message      Additional Information Sent in Packet:          Informed patient that they will need an adult  to bring patient home from surgery: Yes  :           Additional Comments:        All patients questions were answered and was instructed to review surgical packet and call back [phone] with any questions or concerns.       Ger Graf on 12/5/2024 at 11:39 AM

## 2024-12-05 NOTE — PROGRESS NOTES
HPI       Cataract Evaluation    In both eyes.  Associated symptoms include blurred vision, glare, haloes and a need for brighter lights.  Pain was noted as 0/10. Additional comments: Cataract evaluation.             Comments    Pt states vision is worse.  No pain.  Pt is having a lot of glare/haloes around lights.  Pt needs brighter light when reading.  No change to flashes/floaters.  Brimonidine BE 2x/day.    TERESA Morelos December 5, 2024 7:52 AM               Last edited by Mino Lui COT on 12/5/2024  7:52 AM.          Review of systems for the eyes was negative other than the pertinent positives/negatives listed in the HPI.      Assessment & Plan      Glory Oswald is a 28 year old female with the following diagnoses:   1. Cataract in inflammatory disorder, bilateral    2. Intermediate uveitis of both eyes    3. Bilateral ocular hypertension    4. History of papilledema    5. Cerebellar tumor (H)         Referred by Dr. 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  Will schedule surgery today  Special equipment/needs:    Anesthesia:Topical  Dilation:Good  Iris expansion:Not needed  Pseudoexfoliation: No pseudoexfoliation  Trypan Blue: No  OMNI 360 both eyes   Visually significant astigmatism   Discussed surgical corrective options  Reviewed elective nature of surgical correction and patient responsible  fee associated  The patient wishes to proceed with toric Intraocular lens  Goal distance right eye, mini-mono left eye   Right eye first    Will coordinate with Dr. Dior to plan dionicio-operative steroid.  Can consider periocular steroid during surgery as needed            Attending Physician Attestation:  Complete documentation of historical and exam elements from today's encounter can be found in the full encounter summary report (not reduplicated in this progress note).  I personally obtained the chief complaint(s) and history of present illness.  I confirmed and edited as necessary the review of systems, past medical/surgical history, family history, social history, and examination findings as documented by others; and I examined the patient myself.  I personally reviewed the relevant tests, images, and reports as documented above.  I formulated and edited as necessary the assessment and plan and discussed the findings and management plan with the patient and family. Today with Glory Oswald  and her , I reviewed the indications, risks, benefits, and alternatives of the proposed surgical procedure including, but not limited to, failure obtain the desired result  and need for additional surgery, bleeding, infection, loss of vision, loss of the eye, and the remote possibility of permanent damage to any organ system or death with the use of anesthesia.  I provided multiple opportunities for the questions, answered all questions to the best of my ability, and confirmed that my answers and my discussion were understood.     - Milton De Luna MD

## 2024-12-05 NOTE — Clinical Note
We are going to proceed with cataract and OMNI in both eyes  Likely still in Dec, I will let you know when scheduled.    Let me know if you want any periocular steroid during surgery  Thanks Domingo

## 2024-12-05 NOTE — NURSING NOTE
Chief Complaints and History of Present Illnesses   Patient presents with    Cataract Evaluation     Cataract evaluation.     Chief Complaint(s) and History of Present Illness(es)       Cataract Evaluation              Laterality: both eyes    Associated symptoms: blurred vision, glare, haloes and a need for brighter lights    Pain scale: 0/10    Comments: Cataract evaluation.              Comments    Pt states vision is worse.  No pain.  Pt is having a lot of glare/haloes around lights.  Pt needs brighter light when reading.  No change to flashes/floaters.  Brimonidine BE 2x/day.    TERESA Morelos December 5, 2024 7:52 AM

## 2024-12-20 ENCOUNTER — ANESTHESIA EVENT (OUTPATIENT)
Dept: SURGERY | Facility: AMBULATORY SURGERY CENTER | Age: 29
End: 2024-12-20
Payer: COMMERCIAL

## 2024-12-23 ENCOUNTER — ANESTHESIA (OUTPATIENT)
Dept: SURGERY | Facility: AMBULATORY SURGERY CENTER | Age: 29
End: 2024-12-23
Payer: COMMERCIAL

## 2024-12-23 ENCOUNTER — HOSPITAL ENCOUNTER (OUTPATIENT)
Facility: AMBULATORY SURGERY CENTER | Age: 29
Discharge: HOME | End: 2024-12-23
Attending: OPHTHALMOLOGY
Payer: COMMERCIAL

## 2024-12-23 VITALS
RESPIRATION RATE: 14 BRPM | SYSTOLIC BLOOD PRESSURE: 115 MMHG | TEMPERATURE: 97 F | HEART RATE: 85 BPM | OXYGEN SATURATION: 98 % | BODY MASS INDEX: 48.82 KG/M2 | DIASTOLIC BLOOD PRESSURE: 74 MMHG | WEIGHT: 293 LBS | HEIGHT: 65 IN

## 2024-12-23 DIAGNOSIS — H40.053 BILATERAL OCULAR HYPERTENSION: ICD-10-CM

## 2024-12-23 DIAGNOSIS — H26.223 CATARACT IN INFLAMMATORY DISORDER, BILATERAL: Primary | ICD-10-CM

## 2024-12-23 DIAGNOSIS — H30.23 INTERMEDIATE UVEITIS OF BOTH EYES: ICD-10-CM

## 2024-12-23 LAB
HCG UR QL: NEGATIVE
INTERNAL QC OK POCT: NORMAL
POCT KIT EXPIRATION DATE: NORMAL
POCT KIT LOT NUMBER: NORMAL

## 2024-12-23 PROCEDURE — 81025 URINE PREGNANCY TEST: CPT | Performed by: PATHOLOGY

## 2024-12-23 DEVICE — IMPLANTABLE DEVICE: Type: IMPLANTABLE DEVICE | Site: EYE | Status: FUNCTIONAL

## 2024-12-23 RX ORDER — FENTANYL CITRATE 50 UG/ML
INJECTION, SOLUTION INTRAMUSCULAR; INTRAVENOUS PRN
Status: DISCONTINUED | OUTPATIENT
Start: 2024-12-23 | End: 2024-12-23

## 2024-12-23 RX ORDER — KETAMINE HYDROCHLORIDE 10 MG/ML
INJECTION INTRAMUSCULAR; INTRAVENOUS PRN
Status: DISCONTINUED | OUTPATIENT
Start: 2024-12-23 | End: 2024-12-23

## 2024-12-23 RX ORDER — OXYCODONE HYDROCHLORIDE 5 MG/1
10 TABLET ORAL
Status: DISCONTINUED | OUTPATIENT
Start: 2024-12-23 | End: 2024-12-24 | Stop reason: HOSPADM

## 2024-12-23 RX ORDER — LIDOCAINE HYDROCHLORIDE 10 MG/ML
INJECTION, SOLUTION EPIDURAL; INFILTRATION; INTRACAUDAL; PERINEURAL PRN
Status: DISCONTINUED | OUTPATIENT
Start: 2024-12-23 | End: 2024-12-23 | Stop reason: HOSPADM

## 2024-12-23 RX ORDER — ONDANSETRON 2 MG/ML
INJECTION INTRAMUSCULAR; INTRAVENOUS PRN
Status: DISCONTINUED | OUTPATIENT
Start: 2024-12-23 | End: 2024-12-23

## 2024-12-23 RX ORDER — LIDOCAINE 40 MG/G
CREAM TOPICAL
Status: DISCONTINUED | OUTPATIENT
Start: 2024-12-23 | End: 2024-12-24 | Stop reason: HOSPADM

## 2024-12-23 RX ORDER — ONDANSETRON 4 MG/1
4 TABLET, ORALLY DISINTEGRATING ORAL EVERY 30 MIN PRN
Status: DISCONTINUED | OUTPATIENT
Start: 2024-12-23 | End: 2024-12-24 | Stop reason: HOSPADM

## 2024-12-23 RX ORDER — FENTANYL CITRATE 50 UG/ML
25 INJECTION, SOLUTION INTRAMUSCULAR; INTRAVENOUS
Status: DISCONTINUED | OUTPATIENT
Start: 2024-12-23 | End: 2024-12-24 | Stop reason: HOSPADM

## 2024-12-23 RX ORDER — POVIDONE-IODINE 5 %
1 SOLUTION, NON-ORAL OPHTHALMIC (EYE) ONCE
Status: DISCONTINUED | OUTPATIENT
Start: 2024-12-23 | End: 2024-12-24 | Stop reason: HOSPADM

## 2024-12-23 RX ORDER — PROPARACAINE HYDROCHLORIDE 5 MG/ML
1 SOLUTION/ DROPS OPHTHALMIC ONCE
Status: COMPLETED | OUTPATIENT
Start: 2024-12-23 | End: 2024-12-23

## 2024-12-23 RX ORDER — NALOXONE HYDROCHLORIDE 0.4 MG/ML
0.1 INJECTION, SOLUTION INTRAMUSCULAR; INTRAVENOUS; SUBCUTANEOUS
Status: DISCONTINUED | OUTPATIENT
Start: 2024-12-23 | End: 2024-12-24 | Stop reason: HOSPADM

## 2024-12-23 RX ORDER — ONDANSETRON 2 MG/ML
4 INJECTION INTRAMUSCULAR; INTRAVENOUS EVERY 30 MIN PRN
Status: DISCONTINUED | OUTPATIENT
Start: 2024-12-23 | End: 2024-12-24 | Stop reason: HOSPADM

## 2024-12-23 RX ORDER — ACETAMINOPHEN 325 MG/1
975 TABLET ORAL ONCE
Status: COMPLETED | OUTPATIENT
Start: 2024-12-23 | End: 2024-12-23

## 2024-12-23 RX ORDER — DEXAMETHASONE SODIUM PHOSPHATE 10 MG/ML
4 INJECTION, SOLUTION INTRAMUSCULAR; INTRAVENOUS
Status: DISCONTINUED | OUTPATIENT
Start: 2024-12-23 | End: 2024-12-24 | Stop reason: HOSPADM

## 2024-12-23 RX ORDER — TETRACAINE HYDROCHLORIDE 5 MG/ML
SOLUTION OPHTHALMIC PRN
Status: DISCONTINUED | OUTPATIENT
Start: 2024-12-23 | End: 2024-12-23 | Stop reason: HOSPADM

## 2024-12-23 RX ORDER — PREDNISOLONE ACETATE 10 MG/ML
1 SUSPENSION/ DROPS OPHTHALMIC 4 TIMES DAILY
Qty: 10 ML | Refills: 1 | Status: SHIPPED | OUTPATIENT
Start: 2024-12-23

## 2024-12-23 RX ORDER — KETOROLAC TROMETHAMINE 4 MG/ML
1 SOLUTION/ DROPS OPHTHALMIC 4 TIMES DAILY
Qty: 5 ML | Refills: 1 | Status: SHIPPED | OUTPATIENT
Start: 2024-12-23

## 2024-12-23 RX ORDER — MOXIFLOXACIN 5 MG/ML
1 SOLUTION/ DROPS OPHTHALMIC 3 TIMES DAILY
Qty: 3 ML | Refills: 1 | Status: SHIPPED | OUTPATIENT
Start: 2024-12-23

## 2024-12-23 RX ORDER — BETAMETHASONE SODIUM PHOSPHATE AND BETAMETHASONE ACETATE 3; 3 MG/ML; MG/ML
INJECTION, SUSPENSION INTRA-ARTICULAR; INTRALESIONAL; INTRAMUSCULAR; SOFT TISSUE PRN
Status: DISCONTINUED | OUTPATIENT
Start: 2024-12-23 | End: 2024-12-23 | Stop reason: HOSPADM

## 2024-12-23 RX ORDER — OXYCODONE HYDROCHLORIDE 5 MG/1
5 TABLET ORAL
Status: DISCONTINUED | OUTPATIENT
Start: 2024-12-23 | End: 2024-12-24 | Stop reason: HOSPADM

## 2024-12-23 RX ORDER — TIMOLOL MALEATE 5 MG/ML
SOLUTION/ DROPS OPHTHALMIC PRN
Status: DISCONTINUED | OUTPATIENT
Start: 2024-12-23 | End: 2024-12-23 | Stop reason: HOSPADM

## 2024-12-23 RX ORDER — CYCLOPENTOLAT/TROPIC/PHENYLEPH 1%-1%-2.5%
1 DROPS (EA) OPHTHALMIC (EYE)
Status: COMPLETED | OUTPATIENT
Start: 2024-12-23 | End: 2024-12-23

## 2024-12-23 RX ORDER — BALANCED SALT SOLUTION 6.4; .75; .48; .3; 3.9; 1.7 MG/ML; MG/ML; MG/ML; MG/ML; MG/ML; MG/ML
SOLUTION OPHTHALMIC PRN
Status: DISCONTINUED | OUTPATIENT
Start: 2024-12-23 | End: 2024-12-23 | Stop reason: HOSPADM

## 2024-12-23 RX ADMIN — Medication 1 DROP: at 10:06

## 2024-12-23 RX ADMIN — Medication 1 DROP: at 10:11

## 2024-12-23 RX ADMIN — PROPARACAINE HYDROCHLORIDE 1 DROP: 5 SOLUTION/ DROPS OPHTHALMIC at 10:03

## 2024-12-23 RX ADMIN — ONDANSETRON 4 MG: 2 INJECTION INTRAMUSCULAR; INTRAVENOUS at 13:04

## 2024-12-23 RX ADMIN — ACETAMINOPHEN 975 MG: 325 TABLET ORAL at 10:01

## 2024-12-23 RX ADMIN — FENTANYL CITRATE 50 MCG: 50 INJECTION, SOLUTION INTRAMUSCULAR; INTRAVENOUS at 12:39

## 2024-12-23 RX ADMIN — Medication 1 DROP: at 10:03

## 2024-12-23 RX ADMIN — KETAMINE HYDROCHLORIDE 10 MG: 10 INJECTION INTRAMUSCULAR; INTRAVENOUS at 12:39

## 2024-12-23 RX ADMIN — FENTANYL CITRATE 50 MCG: 50 INJECTION, SOLUTION INTRAMUSCULAR; INTRAVENOUS at 12:53

## 2024-12-23 ASSESSMENT — ENCOUNTER SYMPTOMS: SEIZURES: 0

## 2024-12-23 ASSESSMENT — LIFESTYLE VARIABLES: TOBACCO_USE: 0

## 2024-12-23 ASSESSMENT — COPD QUESTIONNAIRES: COPD: 0

## 2024-12-23 NOTE — ANESTHESIA PREPROCEDURE EVALUATION
Anesthesia Pre-Procedure Evaluation    Patient: Glory Oswald   MRN: [id number] : 1995        Procedure : Procedure(s):  RIGHT EYE PHACOEMULSIFICATION, CATARACT, WITH INTRAOCULAR LENS IMPLANT, TORIC LENS  MICROCATHETERIZATION, VISCODILATION OF SCHLEMM'S CANAL          Past Medical History:   Diagnosis Date     Optic disc edema     Right eye     Papilloedema       Past Surgical History:   Procedure Laterality Date     CRANIOTOMY  2014    Procedure: CRANIOTOMY;  Surgeon: Jimi Gilman MD;  Location: [facility] OR     LAPAROSCOPIC INSERTION CATHETER PERITONEAL DIALYSIS  2014    Procedure: LAPAROSCOPIC INSERTION CATHETER PERITONEAL DIALYSIS;  Surgeon: Humble Fonseca MD;  Location: [facility] OR     OPTICAL TRACKING SYSTEM CRANIOTOMY, EXCISE TUMOR, COMBINED  5/15/2014    Procedure: COMBINED OPTICAL TRACKING SYSTEM CRANIOTOMY, EXCISE TUMOR;  Surgeon: Jimi Gilman MD;  Location: [facility] OR     OPTICAL TRACKING SYSTEM IMPLANT SHUNT VENTRICULOPERITONEAL  2014    Procedure: OPTICAL TRACKING SYSTEM IMPLANT SHUNT VENTRICULOPERITONEAL;  Surgeon: Jimi Gilman MD;  Location: [facility] OR      Allergies   Allergen Reactions     Latex Swelling and Rash      Social History     Tobacco Use     Smoking status: Never     Smokeless tobacco: Never   Substance Use Topics     Alcohol use: No      Wt Readings from Last 1 Encounters:   24 139.7 kg (308 lb)        Anesthesia Evaluation   Pt has had prior anesthetic. Type: General.    No history of anesthetic complications       ROS/MED HX  ENT/Pulmonary:    (-) tobacco use, asthma, COPD and sleep apnea   Neurologic:  - neg neurologic ROS  (-) no seizures and no CVA   Cardiovascular:  - neg cardiovascular ROS  (-) murmur   METS/Exercise Tolerance:     Hematologic:    (-) history of blood clots and anemia   Musculoskeletal:  - neg musculoskeletal ROS     GI/Hepatic:    (-) GERD and liver disease   Renal/Genitourinary:    (-) renal disease   Endo:     (+)  "              Obesity,    (-) Type II DM   Psychiatric/Substance Use:  - neg psychiatric ROS  (-) alcohol abuse history   Infectious Disease:  - neg infectious disease ROS  (-) Recent Fever   Malignancy:    (-) malignancy   Other:  - neg other ROS          Physical Exam    Airway        Mallampati: III   TM distance: > 3 FB   Neck ROM: full     Respiratory Devices and Support         Dental       (+) Completely normal teeth      Cardiovascular          Rhythm and rate: regular and normal (-) no murmur    Pulmonary           breath sounds clear to auscultation         OUTSIDE LABS:  CBC:   Lab Results   Component Value Date    WBC 8.8 05/27/2014    WBC 12.8 (H) 05/23/2014    HGB 8.3 (L) 05/30/2014    HGB 7.8 (L) 05/29/2014    HCT 28.7 (L) 05/27/2014    HCT 30.6 (L) 05/23/2014     (H) 05/27/2014     (H) 05/23/2014     BMP:   Lab Results   Component Value Date     05/29/2014     05/27/2014    POTASSIUM 3.9 05/29/2014    POTASSIUM 3.8 05/27/2014    CHLORIDE 103 05/29/2014    CHLORIDE 103 05/27/2014    CO2 24 05/29/2014    CO2 27 05/27/2014    BUN 6 05/29/2014    BUN 8 05/27/2014    CR 0.61 05/29/2014    CR 0.60 05/27/2014     (H) 05/29/2014    GLC 95 05/27/2014     COAGS:   Lab Results   Component Value Date    PTT 37 05/27/2014    INR 1.06 05/27/2014     POC:   Lab Results   Component Value Date     (H) 05/30/2014    HCG Negative 12/23/2024     HEPATIC: No results found for: \"ALBUMIN\", \"PROTTOTAL\", \"ALT\", \"AST\", \"GGT\", \"ALKPHOS\", \"BILITOTAL\", \"BILIDIRECT\", \"EMELY\"  OTHER:   Lab Results   Component Value Date    PH 7.38 05/15/2014    A1C 5.8 05/16/2014    GENOVEVA 8.5 (L) 05/29/2014    PHOS 5.2 (H) 05/29/2014    MAG 1.6 05/29/2014    CRP 44.7 (H) 05/27/2014       Anesthesia Plan    ASA Status:  3    NPO Status:  NPO Appropriate    Anesthesia Type: MAC.      Maintenance: Balanced.        Consents    Anesthesia Plan(s) and associated risks, benefits, and realistic alternatives discussed. " "Questions answered and patient/representative(s) expressed understanding.     - Discussed: Risks, Benefits and Alternatives for BOTH SEDATION and the PROCEDURE were discussed     - Discussed with:  Patient      - Extended Intubation/Ventilatory Support Discussed: No.      - Patient is DNR/DNI Status: No     Use of blood products discussed: Yes.     - Discussed with: Patient.     Postoperative Care    Pain management: Oral pain medications, IV analgesics.   PONV prophylaxis: Ondansetron (or other 5HT-3), Dexamethasone or Solumedrol     Comments:               Alejandro Putnam MD    I have reviewed the pertinent notes and labs in the chart from the past 30 days and (re)examined the patient.  Any updates or changes from those notes are reflected in this note.                         # Severe Obesity: Estimated body mass index is 51.25 kg/m  as calculated from the following:    Height as of this encounter: 1.651 m (5' 5\").    Weight as of this encounter: 139.7 kg (308 lb).             "

## 2024-12-23 NOTE — ANESTHESIA CARE TRANSFER NOTE
Patient: Glory Oswald    Procedure: Procedure(s):  RIGHT EYE PHACOEMULSIFICATION, CATARACT, WITH INTRAOCULAR LENS IMPLANT, TORIC LENS  MICROCATHETERIZATION, VISCODILATION OF SCHLEMM'S CANAL       Diagnosis: Cataract in inflammatory disorder, bilateral [H26.223]  Intermediate uveitis of both eyes [H30.23]  Bilateral ocular hypertension [H40.053]  Diagnosis Additional Information: No value filed.    Anesthesia Type:   MAC     Note:    Oropharynx: oropharynx clear of all foreign objects  Level of Consciousness: awake  Oxygen Supplementation: room air    Independent Airway: airway patency satisfactory and stable  Dentition: dentition unchanged  Vital Signs Stable: post-procedure vital signs reviewed and stable  Report to RN Given: handoff report given  Patient transferred to: Phase II  Comments: Report to Phase II RN  Handoff Report: Identifed the Patient, Identified the Reponsible Provider, Reviewed the pertinent medical history, Discussed the surgical course, Reviewed Intra-OP anesthesia mangement and issues during anesthesia, Set expectations for post-procedure period and Allowed opportunity for questions and acknowledgement of understanding      Vitals:  Vitals Value Taken Time   BP     Temp 36.2  C (97.2  F) 12/23/24 1311   Pulse 79 12/23/24 1311   Resp 14 12/23/24 1311   SpO2 98 % 12/23/24 1311   Vitals shown include unfiled device data.    Electronically Signed By: LIVE March CRNA  December 23, 2024  1:12 PM

## 2024-12-23 NOTE — OR NURSING
Intraocular lens confirmed with the surgeon against the paper biometry prior to patient entering the operating room.

## 2024-12-23 NOTE — ANESTHESIA POSTPROCEDURE EVALUATION
Patient: Glory Oswald    Procedure: Procedure(s):  RIGHT EYE PHACOEMULSIFICATION, CATARACT, WITH INTRAOCULAR LENS IMPLANT, TORIC LENS  MICROCATHETERIZATION, VISCODILATION OF SCHLEMM'S CANAL       Anesthesia Type:  MAC    Note:  Disposition: Outpatient   Postop Pain Control: Uneventful            Sign Out: Well controlled pain   PONV: No   Neuro/Psych: Uneventful            Sign Out: Acceptable/Baseline neuro status   Airway/Respiratory: Uneventful            Sign Out: Acceptable/Baseline resp. status   CV/Hemodynamics: Uneventful            Sign Out: Acceptable CV status; No obvious hypovolemia; No obvious fluid overload   Other NRE: NONE   DID A NON-ROUTINE EVENT OCCUR? No           Last vitals:  Vitals Value Taken Time   /74 12/23/24 1345   Temp 36.1  C (97  F) 12/23/24 1345   Pulse 85 12/23/24 1345   Resp 14 12/23/24 1311   SpO2 97 % 12/23/24 1353   Vitals shown include unfiled device data.    Electronically Signed By: Alejandro Putnam MD  December 23, 2024  2:15 PM

## 2024-12-23 NOTE — DISCHARGE INSTRUCTIONS
[facility]  Home Care Following Anesthesia  For 24 hours after surgery:  Get plenty of rest.  A responsible adult must stay with you for at least 24 hours after you leave the surgery center.  Do not drive or use heavy equipment.  If you have weakness or tingling, don't drive or use heavy equipment until this feeling goes away.   Do not drink alcohol.   Avoid strenuous or risky activities.  Ask for help when climbing stairs.  You may feel lightheaded.  IF so, sit for a few minutes before standing.  Have someone help you get up.   If you have nausea (feel sick to your stomach): Drink only clear liquids such as apple juice, ginger ale, broth or 7-Up.  Rest may also help.  Be sure to drink enough fluids.  Move to a regular diet as you feel able.   You may have a slight fever.  Call the doctor if your fever is over 100 F (37.7 C) (taken under the tongue) or lasts longer than 24 hours.  You may have a dry mouth, a sore throat, muscle aches or trouble sleeping. These should go away after 24 hours.  Do not make important or legal decisions.   It is recommended to avoid smoking.               Tips for taking pain medications  To get the best pain relief possible, remember these points:  Take pain medications as directed, before pain becomes severe.  Pain medication can upset your stomach: taking it with food may help.  Constipation is a common side effect of pain medication. Drink plenty of  fluids.  Eat foods high in fiber. Take a stool softener if recommended by your doctor or pharmacist.  Do not drink alcohol, drive or operate machinery while taking pain medications.  Ask about other ways to control pain, such as with heat, ice or relaxation.    Tylenol/Acetaminophen Consumption    If you feel your pain relief is insufficient, you may take Tylenol/Acetaminophen in addition to your narcotic pain medication.   Be careful not to exceed 4,000 mg of Tylenol/Acetaminophen in a 24 hour  period from all sources.  If you are taking extra strength Tylenol/acetaminophen (500 mg), the maximum dose is 8 tablets in 24 hours.  If you are taking regular strength acetaminophen (325 mg), the maximum dose is 12 tablets in 24 hours.  Tylenol 975 mg given at 10 am.   Ok to take more after 4 pm.       Call a doctor for any of the following:  Signs of infection (fever, growing tenderness at the surgery site, a large amount of drainage or bleeding, severe pain, foul-smelling drainage, redness, swelling).  It has been over 8 to 10 hours since surgery and you are still not able to urinate (pass water).  Headache for over 24 hours.  Numbness, tingling or weakness the day after surgery (if you had spinal anesthesia).  Signs of Covid-19 infection (temperature over 100 degrees, shortness of breath, cough, loss of taste/smell, generalized body aches, persistent headache, chills, sore throat, nausea/vomiting/diarrhea)    Your doctor is:       Dr. Milton De Luna, Ophthalmology: [phone]               After hours and weekends call the hospital @ [phone] and ask for the resident on call for:  Ophthalmology  For emergency care, call the:  [place]:  [phone] (TTY for hearing impaired: [phone])

## 2024-12-23 NOTE — OP NOTE
PREOPERATIVE DIAGNOSIS:   1) Posterior subcapsular cataract (PSC), right eye   2) Mild open angle glaucoma (POAG), right eye   POSTOPERATIVE DIAGNOSIS: Same   PROCEDURES:   1. Cataract extraction with toric intraocular lens implant right eye   2. Viscodilation of Schlemm's Canal, right eye   SURGEON: Milton De Luna M.D.  Assistant: Mikhail Bailey M.D.      INDICATIONS: The patient Glory Oswald presented to the eye clinic with decreased vision secondary to cataract in the right eye. The risks, benefits and alternatives to cataract extraction were discussed. The patient elected to proceed. All questions were answered to the patient's satisfaction.   DESCRIPTION OF PROCEDURE:   Prior to the procedure, appropriate cardiac and respiratory monitors were applied to the patient.  In the pre-operative holding area, a drop of topical tetracaine followed by lidocaine gel followed by povidone iodine.  Pre-operative toric markings were placed at the 90-degree axis using a gentian violet marker while the patient was seated upright.  The patient was brought to the operating room where a surgical pause was carried out to identify with all members of the surgical team the correct surgical site.  With adequate anesthesia, the right eye was prepped and draped in the usual sterile fashion. A lid speculum was placed, and the operating microscope was rotated into position.  Toric axis was reviewed and marked according to pre-operative calculations and the 90-degree marking that had previously been placed.  A paracentesis was created.  Through this limbal paracentesis, the anterior chamber was filled with preservative-free lidocaine followed by viscoelastic.  A temporal wound was created at the limbus using a 2.6 mm blade. A capsulorrhexis was initiated using a bent 25-gauge needle and was completed in continuous and circular fashion using the capsulorrhexis forceps. The lens nucleus was hydrodissected using balanced salt  solution.  The lens nucleus was rotated and removed using phacoemulsification in a stop and chop technique.  Residual cortical material was removed using irrigation-aspiration.  The capsular bag was reinflated to its maximal extent with cohesive viscoelastic.  A 20.0 diopter [id number] was inserted into the capsular bag.  The lens power selected was reviewed using the intraocular lens power measurements that were obtained preoperatively to confirm that the correct lens was selected for the desired post-operative refractive state. The patient and the operating microscope were repositioned to allow for direct gonioscopy.  A Worley-Nimesh lens was placed to visualize the nasal angle anatomy.  The intended insertion site at the trabecular meshwork was identified.  The Schlemm's canal was cannulated and the OMNI was gently advanced into the canal for 360 of viscodilation.  Wounds were prehydrated with BSS.  The remaining viscoelastic was removed from the anterior chamber in its entirety, and the wounds were hydrated and found to be self-sealing.  During anterior chamber filling, the nasal conjunctiva was observed to agueda well.  Intracameral moxifloxacin was administered. Tactile pressure was confirmed to be in a normal range. SubTenon Betamethasone (3 mg) was injected. The lid speculum was removed and a patch and shield were applied.  The patient tolerated the procedure well, and there were no complications.     PLAN: The patient will be discharged to home and will follow up tomorrow morning in the eye clinic.  EBL:  Minimal  Complications:  None    Implant Name Type Inv. Item Serial No.  Lot No. LRB No. Used Action   LENS IOL TECNIS TORIC  [id number] 20.0 [id number] - [id number] Lens/Eye Implant LENS IOL TECNIS TORIC  [id number] 20.0 [id number] [employer]  Right 1 Implanted   OMNI surgical system Lens/Eye Implant   [employer] [id number] Right 1 Implanted                Attending Physician Procedure  Attestation: I was present for the entire procedure       Milton De Luna MD  , Comprehensive Ophthalmology  Department of Ophthalmology and Visual Neurosciences  [facility]

## 2024-12-24 ENCOUNTER — OFFICE VISIT (OUTPATIENT)
Dept: OPHTHALMOLOGY | Facility: CLINIC | Age: 29
End: 2024-12-24
Attending: OPHTHALMOLOGY
Payer: COMMERCIAL

## 2024-12-24 DIAGNOSIS — H40.053 BILATERAL OCULAR HYPERTENSION: ICD-10-CM

## 2024-12-24 DIAGNOSIS — Z98.890 POST-OPERATIVE STATE: Primary | ICD-10-CM

## 2024-12-24 DIAGNOSIS — H30.23 INTERMEDIATE UVEITIS OF BOTH EYES: ICD-10-CM

## 2024-12-24 PROCEDURE — 99211 OFF/OP EST MAY X REQ PHY/QHP: CPT | Performed by: OPHTHALMOLOGY

## 2024-12-24 ASSESSMENT — TONOMETRY
OD_IOP_MMHG: 10
IOP_METHOD: ICARE
OS_IOP_MMHG: 14

## 2024-12-24 ASSESSMENT — EXTERNAL EXAM - LEFT EYE: OS_EXAM: NORMAL

## 2024-12-24 ASSESSMENT — SLIT LAMP EXAM - LIDS
COMMENTS: NORMAL
COMMENTS: NORMAL

## 2024-12-24 ASSESSMENT — EXTERNAL EXAM - RIGHT EYE: OD_EXAM: NORMAL

## 2024-12-24 ASSESSMENT — VISUAL ACUITY
OD_SC: 20/25
METHOD: SNELLEN - LINEAR
OS_SC: 20/50
OS_PH_SC: 20/30
OD_SC+: +1

## 2024-12-24 NOTE — PROGRESS NOTES
HPI       Post Op (Ophthalmology) Right Eye     Additional comments: POD#1 s/p CE/IOL, TORIC lens. MICROCATHETERIZATION, VISCODILATION OF SCHLEMM'S CANAL Right Eye 12/23/2024                 Comments    Pt slept ok last night. Pt feeling pressure and soreness in LE. Occasional scratchy sensation in LE that comes and goes, better with Artificial tears.  No new flashes or floaters. Tearing from RE that comes and goes.  No DM.    BREANNE Oneal December 24, 2024 9:41 AM              Last edited by John Salmeron COMT on 12/24/2024  9:42 AM.          Review of systems for the eyes was negative other than the pertinent positives/negatives listed in the HPI.      Assessment & Plan      Glory Oswald is a 29 year old female with the following diagnoses:   1. Post-operative state    2. Intermediate uveitis of both eyes    3. Bilateral ocular hypertension         PostOp, right eye s/p cataract extraction + OMNI 360, day 1    Doing well  Intraocular pressure excellent  Small hyphema  Keep patch in place at night for 5 days  Start post-operative drops and taper according to instructions  Post-operative do's and don'ts reviewed, questions answered        Patient disposition:   Return in about 1 week (around 12/31/2024) for VT only.           Attending Physician Attestation:  Complete documentation of historical and exam elements from today's encounter can be found in the full encounter summary report (not reduplicated in this progress note).  I personally obtained the chief complaint(s) and history of present illness.  I confirmed and edited as necessary the review of systems, past medical/surgical history, family history, social history, and examination findings as documented by others; and I examined the patient myself.  I personally reviewed the relevant tests, images, and reports as documented above.  I formulated and edited as necessary the assessment and plan and discussed the findings and management  plan with the patient and family. . - Milton De Luna MD

## 2024-12-24 NOTE — NURSING NOTE
Chief Complaints and History of Present Illnesses   Patient presents with    Post Op (Ophthalmology) Right Eye     POD#1 s/p CE/IOL, TORIC lens. MICROCATHETERIZATION, VISCODILATION OF SCHLEMM'S CANAL Right Eye 12/23/2024         Chief Complaint(s) and History of Present Illness(es)       Post Op (Ophthalmology) Right Eye              Comments: POD#1 s/p CE/IOL, TORIC lens. MICROCATHETERIZATION, VISCODILATION OF SCHLEMM'S CANAL Right Eye 12/23/2024                  Comments    Pt slept ok last night. Pt feeling pressure and soreness in LE. Occasional scratchy sensation in LE that comes and goes, better with Artificial tears.  No new flashes or floaters. Tearing from RE that comes and goes.  No DM.    BREANNE Oneal December 24, 2024 9:41 AM

## 2024-12-30 ENCOUNTER — MYC MEDICAL ADVICE (OUTPATIENT)
Dept: OPHTHALMOLOGY | Facility: CLINIC | Age: 29
End: 2024-12-30
Payer: COMMERCIAL

## 2024-12-31 ENCOUNTER — OFFICE VISIT (OUTPATIENT)
Dept: OPHTHALMOLOGY | Facility: CLINIC | Age: 29
End: 2024-12-31
Attending: OPHTHALMOLOGY
Payer: COMMERCIAL

## 2024-12-31 DIAGNOSIS — H20.13 CHRONIC ANTERIOR UVEITIS OF BOTH EYES: ICD-10-CM

## 2024-12-31 DIAGNOSIS — H40.053 BILATERAL OCULAR HYPERTENSION: ICD-10-CM

## 2024-12-31 DIAGNOSIS — Z79.899 HIGH RISK MEDICATION USE: ICD-10-CM

## 2024-12-31 DIAGNOSIS — Z96.1 PSEUDOPHAKIA OF RIGHT EYE: Primary | ICD-10-CM

## 2024-12-31 DIAGNOSIS — H30.23 INTERMEDIATE UVEITIS OF BOTH EYES: ICD-10-CM

## 2024-12-31 DIAGNOSIS — H26.222 CATARACT IN INFLAMMATORY DISORDER, LEFT: ICD-10-CM

## 2024-12-31 PROCEDURE — 99211 OFF/OP EST MAY X REQ PHY/QHP: CPT | Performed by: OPHTHALMOLOGY

## 2024-12-31 RX ORDER — KETOROLAC TROMETHAMINE 4 MG/ML
1 SOLUTION/ DROPS OPHTHALMIC 4 TIMES DAILY
Qty: 5 ML | Refills: 1 | Status: SHIPPED | OUTPATIENT
Start: 2024-12-31

## 2024-12-31 RX ORDER — PREDNISONE 10 MG/1
TABLET ORAL
Qty: 90 TABLET | Refills: 1 | Status: SHIPPED | OUTPATIENT
Start: 2024-12-31

## 2024-12-31 RX ORDER — BRIMONIDINE TARTRATE 2 MG/ML
1 SOLUTION/ DROPS OPHTHALMIC 2 TIMES DAILY
Qty: 15 ML | Refills: 5 | Status: SHIPPED | OUTPATIENT
Start: 2024-12-31

## 2024-12-31 RX ORDER — PREDNISOLONE ACETATE 10 MG/ML
1 SUSPENSION/ DROPS OPHTHALMIC 4 TIMES DAILY
Qty: 10 ML | Refills: 1 | Status: SHIPPED | OUTPATIENT
Start: 2024-12-31

## 2024-12-31 ASSESSMENT — VISUAL ACUITY
OS_PH_SC: 20/25
OD_SC+: -1
OS_SC+: -2
OD_SC: 20/20
METHOD: SNELLEN - LINEAR
OS_SC: 20/40

## 2024-12-31 ASSESSMENT — SLIT LAMP EXAM - LIDS
COMMENTS: NORMAL
COMMENTS: NORMAL

## 2024-12-31 ASSESSMENT — CONF VISUAL FIELD
OS_INFERIOR_NASAL_RESTRICTION: 0
OS_SUPERIOR_NASAL_RESTRICTION: 0
OS_NORMAL: 1
OD_SUPERIOR_NASAL_RESTRICTION: 0
OD_INFERIOR_NASAL_RESTRICTION: 0
OD_INFERIOR_TEMPORAL_RESTRICTION: 0
OD_SUPERIOR_TEMPORAL_RESTRICTION: 0
OD_NORMAL: 1
OS_INFERIOR_TEMPORAL_RESTRICTION: 0
OS_SUPERIOR_TEMPORAL_RESTRICTION: 0
METHOD: COUNTING FINGERS

## 2024-12-31 ASSESSMENT — EXTERNAL EXAM - LEFT EYE: OS_EXAM: NORMAL

## 2024-12-31 ASSESSMENT — TONOMETRY
OS_IOP_MMHG: 13
OD_IOP_MMHG: 20
IOP_METHOD: TONOPEN

## 2024-12-31 ASSESSMENT — EXTERNAL EXAM - RIGHT EYE: OD_EXAM: NORMAL

## 2024-12-31 ASSESSMENT — CUP TO DISC RATIO
OD_RATIO: 0.2
OS_RATIO: 0.25

## 2024-12-31 NOTE — NURSING NOTE
Chief Complaints and History of Present Illnesses   Patient presents with    Post Op (Ophthalmology) Right Eye     Chief Complaint(s) and History of Present Illness(es)       Post Op (Ophthalmology) Right Eye               Comments    Patient states that she is noticing some challenges when doing things with the difference between the eyes. She states that she is getting headaches. She notices it more with work. No pain. She states that her right eye feels dry and scratchy. No flashes of light. She does not floaters in the LE.    Ocular Meds:ketorolac QID in the RE  Prednisolone TID in the RE  Brimonidine BID in BE    30 mg prednisone oral    Layton PEREZ, December 31, 2024 9:09 AM

## 2024-12-31 NOTE — PROGRESS NOTES
Chief Complaint/Presenting Concern:  Uveitis post op    Interval History of Present Ocular Illness:  Glory Oswald is a 29 year old patient who returns for follow up after her cataract surgery and OMNI right eye done last week. Things were blurry after surgery but have been getting better in the distance but up near is difficult. Cheaters have been workable but still has been tough with the eyes being different.     Interval Updates to Medical/Family/Social History:  No change    Relevant Review of Systems Updates:  Sleeping has been tough    Current eye related medications:  Systemic:Adalimumab-ryvk (Humira) Biosimilar 40 mg every 14 days, Oral Methotrexate 20 mg weekly, Folic acid 2mg most days  and 3mg day before and day after methotrexate,Prednisone 30 mg daily  Drops: ketorolac 4x/day right eye,  Prednisolone 3x/day right eye, Brimonidine 2x/day BOTH EYES, off moxifloxcin    No Imaging  today    Assessment:     1. Pseudophakia of right eye (Primary)  Well positioned with an appropriate degree of inflammation     2. Cataract in inflammatory disorder, left  Surgery plans coming up soon     3. Chronic anterior uveitis of both eyes  Right eye appropriate, left eye better    4. Intermediate uveitis of both eyes  No active haze    5. High risk medication use  Adalimumab-ryvk (Humira) Biosimilar 40 mg every 14 days, Oral Methotrexate 20 mg weekly, Folic acid 2mg most days  and 3mg day before and day after methotrexate, Prednisone 30 mg daily    Plan/Recommendations:  For billing, post op visit.   Discussed findings with patient and her Aunt in Law. The right eye is recovering well and we can begin tapering prednisone while continuing drops   There is no inflammation in the front of the left eye and we are awaiting surgery for that eye with Dr. De Luna soon  Eye pressure is 20, 13. We should continue Brimonidine in each eye for now  Recommend additional testing: None needed  Continue these medications unchanged:  Adalimumab-ryvk (Humira) Biosimilar 40 mg every 14 days, Oral Methotrexate 20 mg weekly, Folic acid 2mg most days  and 3mg day before and day after methotrexate  Regarding prednisone pills, we can begin to taper. Beginning 1/1/25-1/7/25, take 2 pills (20 mg) each AM. Then on 1/8/25, go down to 1 pill (10 mg) each Am until 1/13/25 then stop from 1/14-1/17/25  Then for surgery left eye: beginning 1/17/25: (3) days before surgery, take 30 mg (3 pills) each AM. On the day of surgery, take the full dose with your first meal after surgery. Then continue 30 mg (3 pills) each AM until the next visit with Dr. Dior  Continue drops with tapering plan per Dr. De Luna: ketorolac 4x/day right eye,  Prednisolone 3x/day right eye without need to adjust  No steroid or ketorolac drops left eye at this time  Continue Brimonidine 2x/day BOTH EYES  No new treatments needed  Okay to stop wearing eye shield and okay to resume normal activity. Let me know about any work forms    RTC 1/20/25 for Cataract surgery left eye with Dr. De Luna    And JY on 1/28/25 as scheduled for post op visit    Physician Attestation     Attending Physician Attestation:  Complete documentation of historical and exam elements from today's encounter can be found in the full encounter summary report (not reduplicated in this progress note). I personally obtained the chief complaint(s) and history of present illness. I confirmed and edited as necessary the review of systems, past medical/surgical history, family history, social history, and examination findings as documented by others; and I examined the patient myself. I personally reviewed the relevant tests, images, and reports as documented above. I formulated and edited as necessary the assessment and plan and discussed the findings and management plan with the patient and family members present at the time of this visit.  Ricky Dior M.D., Uveitis and Medical Retina, December 31, 2024

## 2024-12-31 NOTE — PATIENT INSTRUCTIONS
Continue these medications unchanged: Adalimumab-ryvk (Humira) Biosimilar 40 mg every 14 days, Oral Methotrexate 20 mg weekly, Folic acid 2mg most days  and 3mg day before and day after methotrexate  Regarding prednisone pills, we can begin to taper. Beginning 1/1/25-1/7/25, take 2 pills (20 mg) each AM. Then on 1/8/25, go down to 1 pill (10 mg) each Am until 1/13/25 then stop from 1/14-1/17/25  Then for surgery left eye: beginning 1/17/25: (3) days before surgery, take 30 mg (3 pills) each AM. On the day of surgery, take the full dose with your first meal after surgery. Then continue 30 mg (3 pills) each AM until the next visit with Dr. Dior  Continue drops with tapering plan per Dr. De Luna: ketorolac 4x/day right eye,  Prednisolone 3x/day right eye without need to adjust  No steroid or ketorolac drops left eye at this time  Continue Brimonidine 2x/day BOTH EYES  No new treatments needed  Okay to stop wearing eye shield and okay to resume normal activity. Let me know about any work forms

## 2024-12-31 NOTE — LETTER
12/31/2024       RE: Glory Oswald  [address]  [place] [address]     Dear Colleague,    Thank you for referring your patient, Glory Oswald, to the [facility] at [facility]. Please see a copy of my visit note below.    Chief Complaint/Presenting Concern:  Uveitis post op    Interval History of Present Ocular Illness:  Glory Oswald is a 29 year old patient who returns for follow up after her cataract surgery and OMNI right eye done last week. Things were blurry after surgery but have been getting better in the distance but up near is difficult. Cheaters have been workable but still has been tough with the eyes being different.     Interval Updates to Medical/Family/Social History:  No change    Relevant Review of Systems Updates:  Sleeping has been tough    Current eye related medications:  Systemic:Adalimumab-ryvk (Humira) Biosimilar 40 mg every 14 days, Oral Methotrexate 20 mg weekly, Folic acid 2mg most days  and 3mg day before and day after methotrexate,Prednisone 30 mg daily  Drops: ketorolac 4x/day right eye,  Prednisolone 3x/day right eye, Brimonidine 2x/day BOTH EYES, off moxifloxcin    No Imaging  today    Assessment:     1. Pseudophakia of right eye (Primary)  Well positioned with an appropriate degree of inflammation     2. Cataract in inflammatory disorder, left  Surgery plans coming up soon     3. Chronic anterior uveitis of both eyes  Right eye appropriate, left eye better    4. Intermediate uveitis of both eyes  No active haze    5. High risk medication use  Adalimumab-ryvk (Humira) Biosimilar 40 mg every 14 days, Oral Methotrexate 20 mg weekly, Folic acid 2mg most days  and 3mg day before and day after methotrexate, Prednisone 30 mg daily    Plan/Recommendations:  For billing, post op visit.   Discussed findings with patient and her aunt-in-law. The right eye is recovering well and we can begin tapering  prednisone while continuing drops   There is no inflammation in the front of the left eye and we are awaiting surgery for that eye with Dr. De Luna soon  Eye pressure is 20, 13. We should continue Brimonidine in each eye for now  Recommend additional testing: None needed  Continue these medications unchanged: Adalimumab-ryvk (Humira) Biosimilar 40 mg every 14 days, Oral Methotrexate 20 mg weekly, Folic acid 2mg most days  and 3mg day before and day after methotrexate  Regarding prednisone pills, we can begin to taper. Beginning 1/1/25-1/7/25, take 2 pills (20 mg) each AM. Then on 1/8/25, go down to 1 pill (10 mg) each Am until 1/13/25 then stop from 1/14-1/17/25  Then for surgery left eye: beginning 1/17/25: (3) days before surgery, take 30 mg (3 pills) each AM. On the day of surgery, take the full dose with your first meal after surgery. Then continue 30 mg (3 pills) each AM until the next visit with Dr. Dior  Continue drops with tapering plan per Dr. De Luna: ketorolac 4x/day right eye,  Prednisolone 3x/day right eye without need to adjust  No steroid or ketorolac drops left eye at this time  Continue Brimonidine 2x/day BOTH EYES  No new treatments needed  Okay to stop wearing eye shield and okay to resume normal activity. Let me know about any work forms    RTC 1/20/25 for Cataract surgery left eye with Dr. De Luna    And JARAD on 1/28/25 as scheduled for post op visit    Physician Attestation    Attending Physician Attestation:  Complete documentation of historical and exam elements from today's encounter can be found in the full encounter summary report (not reduplicated in this progress note). I personally obtained the chief complaint(s) and history of present illness. I confirmed and edited as necessary the review of systems, past medical/surgical history, family history, social history, and examination findings as documented by others; and I examined the patient myself. I personally reviewed the relevant tests,  images, and reports as documented above. I formulated and edited as necessary the assessment and plan and discussed the findings and management plan with the patient and family members present at the time of this visit.  Ricky Dior M.D., Uveitis and Medical Retina, December 31, 2024     Again, thank you for allowing me to participate in the care of your patient.                Sincerely,    Ricky Dior MD  Uveitis and Medical Retina    Department of Ophthalmology & Visual Neurosciences  [facility]

## 2025-01-17 ENCOUNTER — ANESTHESIA EVENT (OUTPATIENT)
Dept: SURGERY | Facility: AMBULATORY SURGERY CENTER | Age: 30
End: 2025-01-17
Payer: COMMERCIAL

## 2025-01-17 RX ORDER — SODIUM CHLORIDE, SODIUM LACTATE, POTASSIUM CHLORIDE, CALCIUM CHLORIDE 600; 310; 30; 20 MG/100ML; MG/100ML; MG/100ML; MG/100ML
INJECTION, SOLUTION INTRAVENOUS CONTINUOUS
Status: CANCELLED | OUTPATIENT
Start: 2025-01-17

## 2025-01-17 RX ORDER — HYDROMORPHONE HYDROCHLORIDE 1 MG/ML
0.4 INJECTION, SOLUTION INTRAMUSCULAR; INTRAVENOUS; SUBCUTANEOUS EVERY 5 MIN PRN
Status: CANCELLED | OUTPATIENT
Start: 2025-01-17

## 2025-01-17 RX ORDER — NALOXONE HYDROCHLORIDE 0.4 MG/ML
0.1 INJECTION, SOLUTION INTRAMUSCULAR; INTRAVENOUS; SUBCUTANEOUS
Status: CANCELLED | OUTPATIENT
Start: 2025-01-17

## 2025-01-17 RX ORDER — FENTANYL CITRATE 50 UG/ML
50 INJECTION, SOLUTION INTRAMUSCULAR; INTRAVENOUS EVERY 5 MIN PRN
Status: CANCELLED | OUTPATIENT
Start: 2025-01-17

## 2025-01-17 RX ORDER — DEXAMETHASONE SODIUM PHOSPHATE 10 MG/ML
4 INJECTION, SOLUTION INTRAMUSCULAR; INTRAVENOUS
Status: CANCELLED | OUTPATIENT
Start: 2025-01-17

## 2025-01-17 RX ORDER — ONDANSETRON 4 MG/1
4 TABLET, ORALLY DISINTEGRATING ORAL EVERY 30 MIN PRN
Status: CANCELLED | OUTPATIENT
Start: 2025-01-17

## 2025-01-17 RX ORDER — ONDANSETRON 2 MG/ML
4 INJECTION INTRAMUSCULAR; INTRAVENOUS EVERY 30 MIN PRN
Status: CANCELLED | OUTPATIENT
Start: 2025-01-17

## 2025-01-17 RX ORDER — FENTANYL CITRATE 50 UG/ML
25 INJECTION, SOLUTION INTRAMUSCULAR; INTRAVENOUS EVERY 5 MIN PRN
Status: CANCELLED | OUTPATIENT
Start: 2025-01-17

## 2025-01-17 RX ORDER — OXYCODONE HYDROCHLORIDE 5 MG/1
5 TABLET ORAL
Status: CANCELLED | OUTPATIENT
Start: 2025-01-17

## 2025-01-17 RX ORDER — HYDROMORPHONE HYDROCHLORIDE 1 MG/ML
0.2 INJECTION, SOLUTION INTRAMUSCULAR; INTRAVENOUS; SUBCUTANEOUS EVERY 5 MIN PRN
Status: CANCELLED | OUTPATIENT
Start: 2025-01-17

## 2025-01-17 RX ORDER — OXYCODONE HYDROCHLORIDE 5 MG/1
10 TABLET ORAL
Status: CANCELLED | OUTPATIENT
Start: 2025-01-17

## 2025-01-20 ENCOUNTER — HOSPITAL ENCOUNTER (OUTPATIENT)
Facility: AMBULATORY SURGERY CENTER | Age: 30
Discharge: HOME | End: 2025-01-20
Attending: OPHTHALMOLOGY
Payer: COMMERCIAL

## 2025-01-20 ENCOUNTER — ANESTHESIA (OUTPATIENT)
Dept: SURGERY | Facility: AMBULATORY SURGERY CENTER | Age: 30
End: 2025-01-20
Payer: COMMERCIAL

## 2025-01-20 VITALS
RESPIRATION RATE: 15 BRPM | TEMPERATURE: 97.8 F | OXYGEN SATURATION: 97 % | SYSTOLIC BLOOD PRESSURE: 119 MMHG | DIASTOLIC BLOOD PRESSURE: 91 MMHG | HEART RATE: 78 BPM | HEIGHT: 65 IN | WEIGHT: 293 LBS | BODY MASS INDEX: 48.82 KG/M2

## 2025-01-20 DIAGNOSIS — H26.223 CATARACT IN INFLAMMATORY DISORDER, BILATERAL: Primary | ICD-10-CM

## 2025-01-20 DIAGNOSIS — H40.053 BILATERAL OCULAR HYPERTENSION: ICD-10-CM

## 2025-01-20 DIAGNOSIS — H30.23 INTERMEDIATE UVEITIS OF BOTH EYES: ICD-10-CM

## 2025-01-20 DEVICE — IMPLANTABLE DEVICE: Type: IMPLANTABLE DEVICE | Site: EYE | Status: FUNCTIONAL

## 2025-01-20 RX ORDER — FENTANYL CITRATE 50 UG/ML
INJECTION, SOLUTION INTRAMUSCULAR; INTRAVENOUS PRN
Status: DISCONTINUED | OUTPATIENT
Start: 2025-01-20 | End: 2025-01-20

## 2025-01-20 RX ORDER — LIDOCAINE HYDROCHLORIDE 10 MG/ML
INJECTION, SOLUTION EPIDURAL; INFILTRATION; INTRACAUDAL; PERINEURAL PRN
Status: DISCONTINUED | OUTPATIENT
Start: 2025-01-20 | End: 2025-01-20 | Stop reason: HOSPADM

## 2025-01-20 RX ORDER — SODIUM CHLORIDE, SODIUM LACTATE, POTASSIUM CHLORIDE, CALCIUM CHLORIDE 600; 310; 30; 20 MG/100ML; MG/100ML; MG/100ML; MG/100ML
INJECTION, SOLUTION INTRAVENOUS CONTINUOUS
Status: CANCELLED | OUTPATIENT
Start: 2025-01-20

## 2025-01-20 RX ORDER — BETAMETHASONE SODIUM PHOSPHATE AND BETAMETHASONE ACETATE 3; 3 MG/ML; MG/ML
INJECTION, SUSPENSION INTRA-ARTICULAR; INTRALESIONAL; INTRAMUSCULAR; SOFT TISSUE PRN
Status: DISCONTINUED | OUTPATIENT
Start: 2025-01-20 | End: 2025-01-20 | Stop reason: HOSPADM

## 2025-01-20 RX ORDER — SODIUM CHLORIDE, SODIUM LACTATE, POTASSIUM CHLORIDE, CALCIUM CHLORIDE 600; 310; 30; 20 MG/100ML; MG/100ML; MG/100ML; MG/100ML
INJECTION, SOLUTION INTRAVENOUS CONTINUOUS
Status: DISCONTINUED | OUTPATIENT
Start: 2025-01-20 | End: 2025-01-21 | Stop reason: HOSPADM

## 2025-01-20 RX ORDER — PROPARACAINE HYDROCHLORIDE 5 MG/ML
1 SOLUTION/ DROPS OPHTHALMIC ONCE
Status: COMPLETED | OUTPATIENT
Start: 2025-01-20 | End: 2025-01-20

## 2025-01-20 RX ORDER — PREDNISOLONE ACETATE 10 MG/ML
1 SUSPENSION/ DROPS OPHTHALMIC 4 TIMES DAILY
Qty: 10 ML | Refills: 1 | Status: SHIPPED | OUTPATIENT
Start: 2025-01-20

## 2025-01-20 RX ORDER — KETOROLAC TROMETHAMINE 4 MG/ML
1 SOLUTION/ DROPS OPHTHALMIC 4 TIMES DAILY
Qty: 5 ML | Refills: 1 | Status: SHIPPED | OUTPATIENT
Start: 2025-01-20

## 2025-01-20 RX ORDER — CYCLOPENTOLAT/TROPIC/PHENYLEPH 1%-1%-2.5%
1 DROPS (EA) OPHTHALMIC (EYE)
Status: COMPLETED | OUTPATIENT
Start: 2025-01-20 | End: 2025-01-20

## 2025-01-20 RX ORDER — BALANCED SALT SOLUTION 6.4; .75; .48; .3; 3.9; 1.7 MG/ML; MG/ML; MG/ML; MG/ML; MG/ML; MG/ML
SOLUTION OPHTHALMIC PRN
Status: DISCONTINUED | OUTPATIENT
Start: 2025-01-20 | End: 2025-01-20 | Stop reason: HOSPADM

## 2025-01-20 RX ORDER — SODIUM CHLORIDE, SODIUM LACTATE, POTASSIUM CHLORIDE, CALCIUM CHLORIDE 600; 310; 30; 20 MG/100ML; MG/100ML; MG/100ML; MG/100ML
INJECTION, SOLUTION INTRAVENOUS CONTINUOUS PRN
Status: DISCONTINUED | OUTPATIENT
Start: 2025-01-20 | End: 2025-01-20

## 2025-01-20 RX ORDER — TIMOLOL MALEATE 5 MG/ML
SOLUTION/ DROPS OPHTHALMIC PRN
Status: DISCONTINUED | OUTPATIENT
Start: 2025-01-20 | End: 2025-01-20 | Stop reason: HOSPADM

## 2025-01-20 RX ORDER — PROPOFOL 10 MG/ML
INJECTION, EMULSION INTRAVENOUS CONTINUOUS PRN
Status: DISCONTINUED | OUTPATIENT
Start: 2025-01-20 | End: 2025-01-20

## 2025-01-20 RX ORDER — MOXIFLOXACIN IN NACL,ISO-OS/PF 0.3MG/0.3
SYRINGE (ML) INTRAOCULAR PRN
Status: DISCONTINUED | OUTPATIENT
Start: 2025-01-20 | End: 2025-01-20 | Stop reason: HOSPADM

## 2025-01-20 RX ORDER — TETRACAINE HYDROCHLORIDE 5 MG/ML
SOLUTION OPHTHALMIC PRN
Status: DISCONTINUED | OUTPATIENT
Start: 2025-01-20 | End: 2025-01-20 | Stop reason: HOSPADM

## 2025-01-20 RX ORDER — MOXIFLOXACIN 5 MG/ML
1 SOLUTION/ DROPS OPHTHALMIC 3 TIMES DAILY
Qty: 3 ML | Refills: 1 | Status: SHIPPED | OUTPATIENT
Start: 2025-01-20

## 2025-01-20 RX ORDER — POVIDONE-IODINE 5 %
1 SOLUTION, NON-ORAL OPHTHALMIC (EYE) ONCE
Status: DISCONTINUED | OUTPATIENT
Start: 2025-01-20 | End: 2025-01-21 | Stop reason: HOSPADM

## 2025-01-20 RX ORDER — LIDOCAINE 40 MG/G
CREAM TOPICAL
Status: DISCONTINUED | OUTPATIENT
Start: 2025-01-20 | End: 2025-01-21 | Stop reason: HOSPADM

## 2025-01-20 RX ORDER — LIDOCAINE HYDROCHLORIDE 20 MG/ML
INJECTION, SOLUTION INFILTRATION; PERINEURAL PRN
Status: DISCONTINUED | OUTPATIENT
Start: 2025-01-20 | End: 2025-01-20

## 2025-01-20 RX ORDER — ACETAMINOPHEN 325 MG/1
975 TABLET ORAL ONCE
Status: COMPLETED | OUTPATIENT
Start: 2025-01-20 | End: 2025-01-20

## 2025-01-20 RX ADMIN — SODIUM CHLORIDE, SODIUM LACTATE, POTASSIUM CHLORIDE, CALCIUM CHLORIDE: 600; 310; 30; 20 INJECTION, SOLUTION INTRAVENOUS at 09:49

## 2025-01-20 RX ADMIN — Medication 1 DROP: at 08:40

## 2025-01-20 RX ADMIN — PROPARACAINE HYDROCHLORIDE 1 DROP: 5 SOLUTION/ DROPS OPHTHALMIC at 08:34

## 2025-01-20 RX ADMIN — FENTANYL CITRATE 50 MCG: 50 INJECTION, SOLUTION INTRAMUSCULAR; INTRAVENOUS at 10:13

## 2025-01-20 RX ADMIN — Medication 1 DROP: at 08:50

## 2025-01-20 RX ADMIN — Medication 1 DROP: at 08:45

## 2025-01-20 RX ADMIN — LIDOCAINE HYDROCHLORIDE 60 MG: 20 INJECTION, SOLUTION INFILTRATION; PERINEURAL at 09:43

## 2025-01-20 RX ADMIN — PROPOFOL 50 MCG/KG/MIN: 10 INJECTION, EMULSION INTRAVENOUS at 09:43

## 2025-01-20 RX ADMIN — ACETAMINOPHEN 975 MG: 325 TABLET ORAL at 08:48

## 2025-01-20 NOTE — ANESTHESIA CARE TRANSFER NOTE
Patient: Glory Oswald    Procedure: Procedure(s):  LEFT EYE PHACOEMULSIFICATION, CATARACT, WITH INTRAOCULAR LENS IMPLANT, TORIC LENS  MICROCATHETERIZATION, VISCODILATION OF SCHLEMM'S CANAL       Diagnosis: Cataract in inflammatory disorder, bilateral [H26.223]  Intermediate uveitis of both eyes [H30.23]  Bilateral ocular hypertension [H40.053]  Diagnosis Additional Information: No value filed.    Anesthesia Type:   MAC     Note:  Anesthesia Care Transfer Notewriter  Vitals:  Vitals Value Taken Time   /80 01/20/25 1018   Temp 36.4  C (97.5  F) 01/20/25 1018   Pulse 87 01/20/25 1017   Resp 14 01/20/25 1018   SpO2 96 % 01/20/25 1020   Vitals shown include unfiled device data.    Electronically Signed By: LIVE Krishnamurthy CRNA  January 20, 2025  10:21 AM

## 2025-01-20 NOTE — DISCHARGE INSTRUCTIONS
[facility]  Home Care Following Anesthesia  For 24 hours after surgery:  Get plenty of rest.  A responsible adult must stay with you for at least 24 hours after you leave the surgery center.  Do not drive or use heavy equipment.  If you have weakness or tingling, don't drive or use heavy equipment until this feeling goes away.   Do not drink alcohol.   Avoid strenuous or risky activities.  Ask for help when climbing stairs.  You may feel lightheaded.  IF so, sit for a few minutes before standing.  Have someone help you get up.   If you have nausea (feel sick to your stomach): Drink only clear liquids such as apple juice, ginger ale, broth or 7-Up.  Rest may also help.  Be sure to drink enough fluids.  Move to a regular diet as you feel able.   You may have a slight fever.  Call the doctor if your fever is over 100 F (37.7 C) (taken under the tongue) or lasts longer than 24 hours.  You may have a dry mouth, a sore throat, muscle aches or trouble sleeping. These should go away after 24 hours.  Do not make important or legal decisions.   It is recommended to avoid smoking.               Tips for taking pain medications  To get the best pain relief possible, remember these points:  Take pain medications as directed, before pain becomes severe.  Pain medication can upset your stomach: taking it with food may help.  Constipation is a common side effect of pain medication. Drink plenty of  fluids.  Eat foods high in fiber. Take a stool softener if recommended by your doctor or pharmacist.  Do not drink alcohol, drive or operate machinery while taking pain medications.  Ask about other ways to control pain, such as with heat, ice or relaxation.    Tylenol/Acetaminophen Consumption    If you feel your pain relief is insufficient, you may take Tylenol/Acetaminophen in addition to your narcotic pain medication.   Be careful not to exceed 4,000 mg of Tylenol/Acetaminophen in a 24 hour  period from all sources.  If you are taking extra strength Tylenol/acetaminophen (500 mg), the maximum dose is 8 tablets in 24 hours.  If you are taking regular strength acetaminophen (325 mg), the maximum dose is 12 tablets in 24 hours.  Tylenol 975 mg given at 945 am.   Ok to take more after 345 pm.       Call a doctor for any of the following:  Signs of infection (fever, growing tenderness at the surgery site, a large amount of drainage or bleeding, severe pain, foul-smelling drainage, redness, swelling).  It has been over 8 to 10 hours since surgery and you are still not able to urinate (pass water).  Headache for over 24 hours.  Numbness, tingling or weakness the day after surgery (if you had spinal anesthesia).  Signs of Covid-19 infection (temperature over 100 degrees, shortness of breath, cough, loss of taste/smell, generalized body aches, persistent headache, chills, sore throat, nausea/vomiting/diarrhea)    Your doctor is:       Dr. Milton De Luna, Ophthalmology: [phone]               After hours and weekends call the hospital @ [phone] and ask for the resident on call for:  Ophthalmology  For emergency care, call the:  [facility]:  [phone] (TTY for hearing impaired: [phone])

## 2025-01-20 NOTE — ANESTHESIA POSTPROCEDURE EVALUATION
Patient: Glory Oswald    Procedure: Procedure(s):  LEFT EYE PHACOEMULSIFICATION, CATARACT, WITH INTRAOCULAR LENS IMPLANT, TORIC LENS  MICROCATHETERIZATION, VISCODILATION OF SCHLEMM'S CANAL       Anesthesia Type:  MAC    Note:  Disposition: Outpatient   Postop Pain Control: Uneventful            Sign Out: Well controlled pain   PONV: No   Neuro/Psych: Uneventful            Sign Out: Acceptable/Baseline neuro status   Airway/Respiratory: Uneventful            Sign Out: Acceptable/Baseline resp. status   CV/Hemodynamics: Uneventful            Sign Out: Acceptable CV status; No obvious hypovolemia; No obvious fluid overload   Other NRE: NONE   DID A NON-ROUTINE EVENT OCCUR? No       Last vitals:  Vitals Value Taken Time   /91 01/20/25 1045   Temp 36.6  C (97.8  F) 01/20/25 1045   Pulse 79 01/20/25 1043   Resp 15 01/20/25 1045   SpO2 97 % 01/20/25 1045   Vitals shown include unfiled device data.    Electronically Signed By: aTli Chavis MD  January 20, 2025  11:21 AM

## 2025-01-20 NOTE — ANESTHESIA CARE TRANSFER NOTE
Patient: Glory Oswald    Procedure: Procedure(s):  LEFT EYE PHACOEMULSIFICATION, CATARACT, WITH INTRAOCULAR LENS IMPLANT, TORIC LENS  MICROCATHETERIZATION, VISCODILATION OF SCHLEMM'S CANAL       Diagnosis: Cataract in inflammatory disorder, bilateral [H26.223]  Intermediate uveitis of both eyes [H30.23]  Bilateral ocular hypertension [H40.053]  Diagnosis Additional Information: No value filed.    Anesthesia Type:   MAC     Note:    Oropharynx: oropharynx clear of all foreign objects and spontaneously breathing  Level of Consciousness: awake  Oxygen Supplementation: room air    Independent Airway: airway patency satisfactory and stable  Dentition: dentition unchanged  Vital Signs Stable: post-procedure vital signs reviewed and stable    Patient transferred to: Phase II    Handoff Report: Identifed the Patient, Identified the Reponsible Provider, Reviewed the pertinent medical history, Discussed the surgical course, Reviewed Intra-OP anesthesia mangement and issues during anesthesia, Set expectations for post-procedure period and Allowed opportunity for questions and acknowledgement of understanding      Vitals:  Vitals Value Taken Time   /80 01/20/25 1018   Temp 36.4  C (97.5  F) 01/20/25 1018   Pulse 87 01/20/25 1017   Resp 14 01/20/25 1018   SpO2 96 % 01/20/25 1020   Vitals shown include unfiled device data.    Electronically Signed By: LIVE Krishnamurthy CRNA  January 20, 2025  10:21 AM

## 2025-01-20 NOTE — ANESTHESIA PREPROCEDURE EVALUATION
Anesthesia Pre-Procedure Evaluation    Patient: Glory Oswald   MRN: [id number] : 1995        Procedure : Procedure(s):  LEFT EYE PHACOEMULSIFICATION, CATARACT, WITH INTRAOCULAR LENS IMPLANT, TORIC LENS  MICROCATHETERIZATION, VISCODILATION OF SCHLEMM'S CANAL          Past Medical History:   Diagnosis Date     Optic disc edema     Right eye     Papilloedema       Past Surgical History:   Procedure Laterality Date     CRANIOTOMY  2014    Procedure: CRANIOTOMY;  Surgeon: Jimi Gilman MD;  Location: [facility]     LAPAROSCOPIC INSERTION CATHETER PERITONEAL DIALYSIS  2014    Procedure: LAPAROSCOPIC INSERTION CATHETER PERITONEAL DIALYSIS;  Surgeon: Humble Fonseca MD;  Location: [facility] OR     MICROCATHETERIZATION, VISCODILATION OF SCHLEMM'S CANAL Right 2024    Procedure: MICROCATHETERIZATION, VISCODILATION OF SCHLEMM'S CANAL;  Surgeon: Milton De Luna MD;  Location: [facility] OR     OPTICAL TRACKING SYSTEM CRANIOTOMY, EXCISE TUMOR, COMBINED  5/15/2014    Procedure: COMBINED OPTICAL TRACKING SYSTEM CRANIOTOMY, EXCISE TUMOR;  Surgeon: Jimi Gilman MD;  Location: [facility] OR     OPTICAL TRACKING SYSTEM IMPLANT SHUNT VENTRICULOPERITONEAL  2014    Procedure: OPTICAL TRACKING SYSTEM IMPLANT SHUNT VENTRICULOPERITONEAL;  Surgeon: Jimi Gilman MD;  Location: [facility]     PHACOEMULSIFICATION CLEAR CORNEA WITH TORIC INTRAOCULAR LENS IMPLANT Right 2024    Procedure: RIGHT EYE PHACOEMULSIFICATION, CATARACT, WITH INTRAOCULAR LENS IMPLANT, TORIC LENS;  Surgeon: Milton De Luna MD;  Location: [facility] OR      Allergies   Allergen Reactions     Latex Swelling and Rash      Social History     Tobacco Use     Smoking status: Never     Smokeless tobacco: Never   Substance Use Topics     Alcohol use: No      Wt Readings from Last 1 Encounters:   25 140.6 kg (310 lb)        Anesthesia Evaluation   Pt has had prior anesthetic. Type: General and MAC.    No history of anesthetic  complications       ROS/MED HX  ENT/Pulmonary:     (+)     STEVIE risk factors, snores loudly,  obese,                                Neurologic: Comment: S/p cerebellar mass resection, remaining hydrocephalus tx with right sided  shunt.  Glaucoma, papilledema, cataract.      Cardiovascular:  - neg cardiovascular ROS     METS/Exercise Tolerance: 4 - Raking leaves, gardening    Hematologic:  - neg hematologic  ROS     Musculoskeletal:  - neg musculoskeletal ROS     GI/Hepatic:  - neg GI/hepatic ROS     Renal/Genitourinary:  - neg Renal ROS     Endo:     (+)               Obesity,       Psychiatric/Substance Use:     (+) psychiatric history anxiety       Infectious Disease:  - neg infectious disease ROS     Malignancy:  - neg malignancy ROS     Other:          Physical Exam    Airway        Mallampati: II   TM distance: > 3 FB   Neck ROM: full   Mouth opening: > 3 cm    Respiratory Devices and Support         Dental       (+) Minor Abnormalities - some fillings, tiny chips      Cardiovascular   cardiovascular exam normal       Rhythm and rate: regular and normal     Pulmonary   pulmonary exam normal        breath sounds clear to auscultation       OUTSIDE LABS:  CBC:   Lab Results   Component Value Date    WBC 8.8 05/27/2014    WBC 12.8 (H) 05/23/2014    HGB 8.3 (L) 05/30/2014    HGB 7.8 (L) 05/29/2014    HCT 28.7 (L) 05/27/2014    HCT 30.6 (L) 05/23/2014     (H) 05/27/2014     (H) 05/23/2014     BMP:   Lab Results   Component Value Date     05/29/2014     05/27/2014    POTASSIUM 3.9 05/29/2014    POTASSIUM 3.8 05/27/2014    CHLORIDE 103 05/29/2014    CHLORIDE 103 05/27/2014    CO2 24 05/29/2014    CO2 27 05/27/2014    BUN 6 05/29/2014    BUN 8 05/27/2014    CR 0.61 05/29/2014    CR 0.60 05/27/2014     (H) 05/29/2014    GLC 95 05/27/2014     COAGS:   Lab Results   Component Value Date    PTT 37 05/27/2014    INR 1.06 05/27/2014     POC:   Lab Results   Component Value Date      "(H) 05/30/2014    HCG Negative 01/20/2025     HEPATIC: No results found for: \"ALBUMIN\", \"PROTTOTAL\", \"ALT\", \"AST\", \"GGT\", \"ALKPHOS\", \"BILITOTAL\", \"BILIDIRECT\", \"EMELY\"  OTHER:   Lab Results   Component Value Date    PH 7.38 05/15/2014    A1C 5.8 05/16/2014    GENOVEVA 8.5 (L) 05/29/2014    PHOS 5.2 (H) 05/29/2014    MAG 1.6 05/29/2014    CRP 44.7 (H) 05/27/2014       Anesthesia Plan    ASA Status:  3    NPO Status:  NPO Appropriate    Anesthesia Type: MAC.     - Reason for MAC: straight local not clinically adequate, immobility needed              Consents    Anesthesia Plan(s) and associated risks, benefits, and realistic alternatives discussed. Questions answered and patient/representative(s) expressed understanding.     - Discussed:     - Discussed with:  Patient            Postoperative Care    Pain management: Multi-modal analgesia.   PONV prophylaxis: Ondansetron (or other 5HT-3)     Comments:             Tali Chavis MD    I have reviewed the pertinent notes and labs in the chart from the past 30 days and (re)examined the patient.  Any updates or changes from those notes are reflected in this note.                         # Severe Obesity: Estimated body mass index is 51.59 kg/m  as calculated from the following:    Height as of this encounter: 1.651 m (5' 5\").    Weight as of this encounter: 140.6 kg (310 lb).             "

## 2025-01-20 NOTE — OP NOTE
PREOPERATIVE DIAGNOSIS:  1. Combined cataract, left eye   2. Mild primary open angle glaucoma (POAG), left eye     POSTOPERATIVE DIAGNOSIS: Same     PROCEDURES:   1. Cataract extraction with toric intraocular lens implant left eye.  2. Viscodilation of Schlemm's Canal left eye     SURGEON: Milton De Luna M.D.  Assistant: Susan Lux MS3    INDICATIONS: The patient Glory Oswald presented to the eye clinic with decreased vision secondary to cataract in the left eye. The risks, benefits and alternatives to cataract extraction were discussed. The patient elected to proceed. All questions were answered to the patient's satisfaction.     DESCRIPTION OF PROCEDURE:  Prior to the procedure, appropriate cardiac and respiratory monitors were applied to the patient. In the pre-operative holding area, a drop of topical tetracaine followed by lidocaine gel followed by povidone iodine.  The patient was brought to the operating room where a surgical pause was carried out to identify with all members of the surgical team the correct surgical site.  With adequate anesthesia, the left eye was prepped and draped in the usual sterile fashion.  A lid speculum was placed, and the operating microscope was rotated into position. A paracentesis was created.  Through this limbal paracentesis, the anterior chamber was inflated with viscoelastic. A temporal wound was created at the limbus using a 2.5 mm blade. A capsulorrhexis was initiated using a bent 25-gauge needle and was completed in continuous and circular fashion using the capsulorrhexis forceps. The lens nucleus was hydrodissected using balanced salt solution.  The lens nucleus was rotated and removed using phacoemulsification in a stop and chop technique.  Residual cortical material was removed using irrigation-aspiration.  The capsular bag was reinflated to its maximal extent with viscoelastic.  A 20.5 diopter [id number] was inserted into the capsular bag.  The lens power selected  was reviewed using the intraocular lens power measurements that were obtained preoperatively to confirm that the correct lens was selected for the desired post-operative refractive state.  The patient and the operating microscope were repositioned to allow for direct gonioscopy.  A Moraga-Nimesh lens was placed to visualize the nasal angle anatomy.  The intended insertion site at the trabecular meshwork was identified.  The Schlemm's canal was cannulated and the OMNI was gently advanced into the canal for 360 of viscodilation.  Wounds were prehydrated with BSS.  The remaining viscoelastic was removed from the anterior chamber in its entirety, and the wounds were hydrated and found to be self-sealing.  During anterior chamber filling, the nasal conjunctiva was observed to agueda well.  Intracameral moxifloxacin was administered. Tactile pressure was confirmed to be in a normal range. Subconjunctival Dexamethasone (2 mg) was injected. The lid speculum was removed and a patch and shield were applied.  The patient tolerated the procedure well, and there were no complications.     PLAN: The patient will be discharged to home and will follow up tomorrow morning in the eye clinic.  EBL:  1-2 mL   Complications:  None    Implant Name Type Inv. Item Serial No.  Lot No. LRB No. Used Action   LENS IOL TECNIS TORIC  [id number] 20.5 [id number] - [id number] Lens/Eye Implant LENS IOL TECNIS TORIC  [id number] 20.5 [id number] [employer]  Left 1 Implanted                Attending Physician Procedure Attestation: I was present for the entire procedure       Milton De Luna MD  , Comprehensive Ophthalmology  Department of Ophthalmology and Visual Neurosciences  [facility]

## 2025-01-21 ENCOUNTER — OFFICE VISIT (OUTPATIENT)
Dept: OPHTHALMOLOGY | Facility: CLINIC | Age: 30
End: 2025-01-21
Attending: OPHTHALMOLOGY
Payer: COMMERCIAL

## 2025-01-21 DIAGNOSIS — H40.053 BILATERAL OCULAR HYPERTENSION: ICD-10-CM

## 2025-01-21 DIAGNOSIS — Z96.1 PSEUDOPHAKIA, BOTH EYES: Primary | ICD-10-CM

## 2025-01-21 DIAGNOSIS — Z98.890 POST-OPERATIVE STATE: ICD-10-CM

## 2025-01-21 PROCEDURE — 99214 OFFICE O/P EST MOD 30 MIN: CPT | Performed by: OPHTHALMOLOGY

## 2025-01-21 PROCEDURE — 99024 POSTOP FOLLOW-UP VISIT: CPT | Performed by: OPHTHALMOLOGY

## 2025-01-21 ASSESSMENT — SLIT LAMP EXAM - LIDS
COMMENTS: NORMAL
COMMENTS: NORMAL

## 2025-01-21 ASSESSMENT — TONOMETRY
OS_IOP_MMHG: 11
OD_IOP_MMHG: 17
IOP_METHOD: ICARE

## 2025-01-21 ASSESSMENT — VISUAL ACUITY
OD_SC: 20/20
OS_SC: 20/25
METHOD: SNELLEN - LINEAR
OS_SC+: -1

## 2025-01-21 ASSESSMENT — EXTERNAL EXAM - LEFT EYE: OS_EXAM: NORMAL

## 2025-01-21 ASSESSMENT — EXTERNAL EXAM - RIGHT EYE: OD_EXAM: NORMAL

## 2025-01-21 NOTE — PROGRESS NOTES
HPI       Post Op (Ophthalmology) Both Eyes    In both eyes.  Associated symptoms include eye pain, tearing and fatigue.  Negative for headache.  Treatments tried include eye drops.  Pain was noted as 6/10. Additional comments: Cataract extraction with toric IOL in the left eye on 01/20/2025   Cataract extraction with toric IOL in the right eye on 12/23/2024                Comments    She states that she is having some left eye pain this morning.  She has taken some OTC pain relievers.  Today her vision is improving but is still murky, she tells me.    TERESA Kearney 8:51 AM  January 21, 2025               Last edited by Jonelle Payne COT on 1/21/2025  8:52 AM.          Review of systems for the eyes was negative other than the pertinent positives/negatives listed in the HPI.      Assessment & Plan      Glory Oswald is a 29 year old female with the following diagnoses:   1. Pseudophakia, both eyes    2. Post-operative state    3. Bilateral ocular hypertension         Postoperative day 1 s/p cataract extraction + OMNI 360 left eye   Doing well, intraocular pressure excellent    Keep patch in place at night for 5 days  Oral pred per JY  Start Predforte and ketorolac four times a day left eye, taper per instruction  Start moxi three times a day left eye   Continue right eye drops the same for now  Post-operative do's and don'ts reviewed, questions answered        Patient disposition:   1 week with Barby  Return in about 3 weeks (around 2/11/2025) for Refraction, DFE, OCT Macula.           Attending Physician Attestation:  Complete documentation of historical and exam elements from today's encounter can be found in the full encounter summary report (not reduplicated in this progress note).  I personally obtained the chief complaint(s) and history of present illness.  I confirmed and edited as necessary the review of systems, past medical/surgical history, family history, social history, and examination  findings as documented by others; and I examined the patient myself.  I personally reviewed the relevant tests, images, and reports as documented above.  I formulated and edited as necessary the assessment and plan and discussed the findings and management plan with the patient and family. . - Milton De Luna MD

## 2025-01-21 NOTE — NURSING NOTE
Chief Complaints and History of Present Illnesses   Patient presents with    Post Op (Ophthalmology) Both Eyes     Cataract extraction with toric IOL in the left eye on 01/20/2025   Cataract extraction with toric IOL in the right eye on 12/23/2024        Chief Complaint(s) and History of Present Illness(es)       Post Op (Ophthalmology) Both Eyes              Laterality: both eyes    Associated symptoms: eye pain, tearing and fatigue.  Negative for headache    Treatments tried: eye drops    Pain scale: 6/10    Comments: Cataract extraction with toric IOL in the left eye on 01/20/2025   Cataract extraction with toric IOL in the right eye on 12/23/2024                 Comments    She states that she is having some left eye pain this morning.  She has taken some OTC pain relievers.  Today her vision is improving but is still murky, she tells me.    Jonelle Payne, COT 8:51 AM  January 21, 2025

## 2025-01-28 ENCOUNTER — OFFICE VISIT (OUTPATIENT)
Dept: OPHTHALMOLOGY | Facility: CLINIC | Age: 30
End: 2025-01-28
Attending: OPHTHALMOLOGY
Payer: COMMERCIAL

## 2025-01-28 DIAGNOSIS — Z79.899 HIGH RISK MEDICATION USE: ICD-10-CM

## 2025-01-28 DIAGNOSIS — H30.23 INTERMEDIATE UVEITIS OF BOTH EYES: ICD-10-CM

## 2025-01-28 DIAGNOSIS — Z96.1 PSEUDOPHAKIA, BOTH EYES: Primary | ICD-10-CM

## 2025-01-28 DIAGNOSIS — H40.053 BILATERAL OCULAR HYPERTENSION: ICD-10-CM

## 2025-01-28 DIAGNOSIS — H20.13 CHRONIC ANTERIOR UVEITIS OF BOTH EYES: ICD-10-CM

## 2025-01-28 PROCEDURE — 99213 OFFICE O/P EST LOW 20 MIN: CPT | Performed by: OPHTHALMOLOGY

## 2025-01-28 ASSESSMENT — CONF VISUAL FIELD
OS_SUPERIOR_TEMPORAL_RESTRICTION: 0
OS_NORMAL: 1
OD_INFERIOR_TEMPORAL_RESTRICTION: 0
OD_SUPERIOR_TEMPORAL_RESTRICTION: 0
OS_SUPERIOR_NASAL_RESTRICTION: 0
OD_SUPERIOR_NASAL_RESTRICTION: 0
OS_INFERIOR_TEMPORAL_RESTRICTION: 0
OD_NORMAL: 1
OS_INFERIOR_NASAL_RESTRICTION: 0
OD_INFERIOR_NASAL_RESTRICTION: 0
METHOD: COUNTING FINGERS

## 2025-01-28 ASSESSMENT — SLIT LAMP EXAM - LIDS
COMMENTS: NORMAL
COMMENTS: NORMAL

## 2025-01-28 ASSESSMENT — TONOMETRY
OS_IOP_MMHG: 18
IOP_METHOD: TONOPEN
OD_IOP_MMHG: 20

## 2025-01-28 ASSESSMENT — CUP TO DISC RATIO
OD_RATIO: 0.2
OS_RATIO: 0.25

## 2025-01-28 ASSESSMENT — EXTERNAL EXAM - LEFT EYE: OS_EXAM: NORMAL

## 2025-01-28 ASSESSMENT — VISUAL ACUITY
OS_SC: 20/20
METHOD: SNELLEN - LINEAR
OD_SC: 20/20

## 2025-01-28 ASSESSMENT — EXTERNAL EXAM - RIGHT EYE: OD_EXAM: NORMAL

## 2025-01-28 NOTE — PROGRESS NOTES
Chief Complaint/Presenting Concern:  Uveitis post op    Interval History of Present Ocular Illness:  Glory Oswald is a 29 year old patient who returns for follow up after her cataract surgery and OMNI left eye. Last visit things were doing well right eye and we anticipated plans for surgery left eye which was done with Dr. De Luna on 1/21/25 and things did great post operatively.    Glory reports things are doing well, mild discomfort and getting used to new set up with new glasses.    Interval Updates to Medical/Family/Social History:  Some changes at work    Relevant Review of Systems Updates:  Now feeling well.     Current eye related medications:   Systemic:Adalimumab-ryvk (Humira) Biosimilar 40 mg every 14 days, Oral Methotrexate 20 mg weekly, Folic acid 2mg most days  and 3mg day before and day after methotrexate,Prednisone 30 mg daily  (for the last 10 days or so)  Drops right eye: Brimonidine 2x/day   Drops left eye: Prednisolone 3x/day, Ketorolac 4x/day, No Beige top, Brimonidine 2x/day each eye     No Imaging    Assessment:     1. Pseudophakia, both eyes (Primary)  Doing great in both eyes!    2. Bilateral ocular hypertension  IOP controlled     3. Chronic anterior uveitis of both eyes  Right eye doing very well, left eye improving    4. High risk medication use  Adalimumab-ryvk (Humira) Biosimilar 40 mg every 14 days, Oral Methotrexate 20 mg weekly, Folic acid 2mg most days  and 3mg day before and day after methotrexate,Prednisone 30 mg daily (for the last 10 days or so)    Plan/Recommendations:  *For billing*  Discussed findings with patient and her . The left eye is doing very well with improving inflammation after surgery. We can taper drops and prednisone pills  The right eye still has low grade inflammation. We can modify drops slightly  Eye pressure is 20,18. We can continue Brimonidine 2x/day in each eye per Dr. De Luna  Recommend additional testing: None needed   Continue these  medications:unchanged: Adalimumab-ryvk (Humira) Biosimilar 40 mg every 14 days, Oral Methotrexate 20 mg weekly, Folic acid 2mg most days  and 3mg day before and day after methotrexate,  For the prednisone pills, take 2 pills (20 mg) today through Monday, 2/3. Then on 2/4, take 1 pill (10 mg) each day until 2/10, then stop  For the right eye: Continue Brimonidine 2x/day, let's add back prednisolone 2x/day until next visit  For the left eye: Continue prednisolone 3x/day this week and then 2x/day rather than tapering any further. Keep Brimonidine 2x/day unchanged. For the Ketorolac, continue 4x/day until you are asked to stop (roughly one week)    RTC 2/18/25 as scheduled with Dr. De Luna--mid to late March with JARAD walker, AC Check, then dilate and OCT and RNFL (ordered)    Physician Attestation     Attending Physician Attestation:  Complete documentation of historical and exam elements from today's encounter can be found in the full encounter summary report (not reduplicated in this progress note). I personally obtained the chief complaint(s) and history of present illness. I confirmed and edited as necessary the review of systems, past medical/surgical history, family history, social history, and examination findings as documented by others; and I examined the patient myself. I personally reviewed the relevant tests, images, and reports as documented above. I formulated and edited as necessary the assessment and plan and discussed the findings and management plan with the patient and family members present at the time of this visit.  Ricky Dior M.D., Uveitis and Medical Retina, January 28, 2025

## 2025-01-28 NOTE — PATIENT INSTRUCTIONS
Continue these medications:unchanged: Adalimumab-ryvk (Humira) Biosimilar 40 mg every 14 days, Oral Methotrexate 20 mg weekly, Folic acid 2mg most days  and 3mg day before and day after methotrexate,  For the prednisone pills, take 2 pills (20 mg) today through Monday, 2/3. Then on 2/4, take 1 pill (10 mg) each day until 2/10, then stop  For the right eye: Continue Brimonidine 2x/day, let's add back prednisolone 2x/day until next visit  For the left eye: Continue prednisolone 3x/day this week and then 2x/day rather than tapering any further. Keep Brimonidine 2x/day unchanged. For the Ketorolac, continue 4x/day until you are asked to stop (roughly one week)

## 2025-01-28 NOTE — NURSING NOTE
Chief Complaints and History of Present Illnesses   Patient presents with    Post Op (Ophthalmology) Left Eye     POD1      Chief Complaint(s) and History of Present Illness(es)       Post Op (Ophthalmology) Left Eye              Laterality: left eye    Associated symptoms: dryness and eye pain.  Negative for flashes and floaters    Pain scale: 1/10    Comments: POD1               Comments    Patient reports no change to vision since last visit and notes ache around operative eye.     Ocular Meds:   Pink TID  Clarke four times  Tan done  Purple top BID each eye   Avis BECERRIL 9:12 AM January 28, 2025

## 2025-01-28 NOTE — LETTER
1/28/2025       RE: Glory Oswald  [address] [facility] [id number]     Dear Colleague,    Thank you for referring your patient, Glory Oswald, to the [facility] [place] at [facility]. Please see a copy of my visit note below.    Chief Complaint/Presenting Concern:  Uveitis post op    Interval History of Present Ocular Illness:  Glory Oswald is a 29 year old patient who returns for follow up after her cataract surgery and OMNI left eye. Last visit things were doing well right eye and we anticipated plans for surgery left eye which was done with Dr. De Luna on 1/21/25 and things did great post operatively.    Glory reports things are doing well, mild discomfort and getting used to new set up with new glasses.    Interval Updates to Medical/Family/Social History:  Some changes at work    Relevant Review of Systems Updates:  Now feeling well.     Current eye related medications:   Systemic:Adalimumab-ryvk (Humira) Biosimilar 40 mg every 14 days, Oral Methotrexate 20 mg weekly, Folic acid 2mg most days  and 3mg day before and day after methotrexate,Prednisone 30 mg daily  (for the last 10 days or so)  Drops right eye: Brimonidine 2x/day   Drops left eye: Prednisolone 3x/day, Ketorolac 4x/day, No Beige top, Brimonidine 2x/day each eye     No Imaging    Assessment:     1. Pseudophakia, both eyes (Primary)  Doing great in both eyes!    2. Bilateral ocular hypertension  IOP controlled     3. Chronic anterior uveitis of both eyes  Right eye doing very well, left eye improving    4. High risk medication use  Adalimumab-ryvk (Humira) Biosimilar 40 mg every 14 days, Oral Methotrexate 20 mg weekly, Folic acid 2mg most days  and 3mg day before and day after methotrexate,Prednisone 30 mg daily (for the last 10 days or so)    Plan/Recommendations:  *For billing*  Discussed findings with patient and her . The left eye is doing very well  with improving inflammation after surgery. We can taper drops and prednisone pills  The right eye still has low grade inflammation. We can modify drops slightly  Eye pressure is 20,18. We can continue Brimonidine 2x/day in each eye per Dr. De Luna  Recommend additional testing: None needed   Continue these medications:unchanged: Adalimumab-ryvk (Humira) Biosimilar 40 mg every 14 days, Oral Methotrexate 20 mg weekly, Folic acid 2mg most days  and 3mg day before and day after methotrexate,  For the prednisone pills, take 2 pills (20 mg) today through Monday, 2/3. Then on 2/4, take 1 pill (10 mg) each day until 2/10, then stop  For the right eye: Continue Brimonidine 2x/day, let's add back prednisolone 2x/day until next visit  For the left eye: Continue prednisolone 3x/day this week and then 2x/day rather than tapering any further. Keep Brimonidine 2x/day unchanged. For the Ketorolac, continue 4x/day until you are asked to stop (roughly one week)    RTC 2/18/25 as scheduled with Dr. De Luna--mid to late March with JARAD walker, AC Check, then dilate and OCT and RNFL (ordered)    Physician Attestation    Attending Physician Attestation:  Complete documentation of historical and exam elements from today's encounter can be found in the full encounter summary report (not reduplicated in this progress note). I personally obtained the chief complaint(s) and history of present illness. I confirmed and edited as necessary the review of systems, past medical/surgical history, family history, social history, and examination findings as documented by others; and I examined the patient myself. I personally reviewed the relevant tests, images, and reports as documented above. I formulated and edited as necessary the assessment and plan and discussed the findings and management plan with the patient and family members present at the time of this visit.  Ricky Dior M.D., Uveitis and Medical Retina, January 28, 2025     Again, thank  you for allowing me to participate in the care of your patient.      Sincerely,    Ricky Dior MD  Uveitis and Medical Retina    Department of Ophthalmology & Visual Neurosciences  [facility]

## 2025-01-28 NOTE — Clinical Note
Looking great! Keep prednisolone a bit longer left eye and restarting right eye, but tapering oral pred. Thanks again!

## 2025-02-18 ENCOUNTER — OFFICE VISIT (OUTPATIENT)
Dept: OPHTHALMOLOGY | Facility: CLINIC | Age: 30
End: 2025-02-18
Attending: OPHTHALMOLOGY
Payer: COMMERCIAL

## 2025-02-18 DIAGNOSIS — Z96.1 PSEUDOPHAKIA, BOTH EYES: Primary | ICD-10-CM

## 2025-02-18 PROCEDURE — 92015 DETERMINE REFRACTIVE STATE: CPT

## 2025-02-18 PROCEDURE — 99024 POSTOP FOLLOW-UP VISIT: CPT | Performed by: OPHTHALMOLOGY

## 2025-02-18 PROCEDURE — 99213 OFFICE O/P EST LOW 20 MIN: CPT | Performed by: OPHTHALMOLOGY

## 2025-02-18 ASSESSMENT — REFRACTION_MANIFEST
OD_ADD: +2.50
OS_SPHERE: PLANO
OD_CYLINDER: SPHERE
OD_SPHERE: +0.50
OS_ADD: +2.50
OS_CYLINDER: SPHERE

## 2025-02-18 ASSESSMENT — VISUAL ACUITY
OS_SC: 20/20
OD_SC: 20/20
METHOD: SNELLEN - LINEAR

## 2025-02-18 ASSESSMENT — EXTERNAL EXAM - LEFT EYE: OS_EXAM: NORMAL

## 2025-02-18 ASSESSMENT — SLIT LAMP EXAM - LIDS
COMMENTS: NORMAL
COMMENTS: NORMAL

## 2025-02-18 ASSESSMENT — TONOMETRY
OS_IOP_MMHG: 19
OD_IOP_MMHG: 19
IOP_METHOD: TONOPEN

## 2025-02-18 ASSESSMENT — EXTERNAL EXAM - RIGHT EYE: OD_EXAM: NORMAL

## 2025-02-18 ASSESSMENT — CUP TO DISC RATIO
OD_RATIO: 0.2
OS_RATIO: 0.25

## 2025-02-18 NOTE — PROGRESS NOTES
HPI       Post Op (Ophthalmology) Both Eyes    In both eyes.  Since onset it is stable.  Associated symptoms include dryness.  Negative for eye pain, tearing and photophobia.  Treatments tried include eye drops and artificial tears.  Pain was noted as 0/10. Additional comments: Cataract extraction with toric IOL in the left eye on 01/20/2025   Cataract extraction with toric IOL in the right eye on 12/23/2024               Comments    She states that her vision seems to stable and clear in each eye in the distance.   She tells me that two weeks ago she had a uveitis flare in each eye, worse in her left eye.  As advised by Dr Dior she increased her steroid eye drops.  Increasing the drops has resolved her symptoms.    She is currently using:  Prednisolone acetate (as of yesterday) TID each eye   Brimonidine BID each eye   Artificial tears as needed    TERESA Kearney 9:29 AM  February 18, 2025               Last edited by Jonelle Payne COT on 2/18/2025  9:30 AM.          Review of systems for the eyes was negative other than the pertinent positives/negatives listed in the HPI.      Assessment & Plan      Glory Oswald is a 29 year old female with the following diagnoses:   1. Pseudophakia, both eyes         Doing well  Ok to resume normal activities  Glasses prescription updated  Artificial tears as needed    Continue Brimonidine 2x/day both eyes   Prednisolone and ketorolac per JY      Patient disposition:   Return in about 4 months (around 6/18/2025) for VT only, OCT NFL, HVF.           Attending Physician Attestation:  Complete documentation of historical and exam elements from today's encounter can be found in the full encounter summary report (not reduplicated in this progress note).  I personally obtained the chief complaint(s) and history of present illness.  I confirmed and edited as necessary the review of systems, past medical/surgical history, family history, social history, and examination  findings as documented by others; and I examined the patient myself.  I personally reviewed the relevant tests, images, and reports as documented above.  I formulated and edited as necessary the assessment and plan and discussed the findings and management plan with the patient and family. . - Milton De Luna MD

## 2025-02-18 NOTE — NURSING NOTE
Chief Complaints and History of Present Illnesses   Patient presents with    Post Op (Ophthalmology) Both Eyes     Cataract extraction with toric IOL in the left eye on 01/20/2025   Cataract extraction with toric IOL in the right eye on 12/23/2024       Chief Complaint(s) and History of Present Illness(es)       Post Op (Ophthalmology) Both Eyes              Laterality: both eyes    Course: stable    Associated symptoms: dryness.  Negative for eye pain, tearing and photophobia    Treatments tried: eye drops and artificial tears    Pain scale: 0/10    Comments: Cataract extraction with toric IOL in the left eye on 01/20/2025   Cataract extraction with toric IOL in the right eye on 12/23/2024                Comments    She states that her vision seems to stable and clear in each eye in the distance.   She tells me that two weeks ago she had a uveitis flare in each eye, worse in her left eye.  As advised by Dr Dior she increased her steroid eye drops.      She is currently using:  Prednisolone acetate (as of yesterday) TID each eye   Brimonidine BID each eye   Artificial tears as needed    TERESA Kearney 9:29 AM  February 18, 2025

## 2025-03-25 ENCOUNTER — OFFICE VISIT (OUTPATIENT)
Dept: OPHTHALMOLOGY | Facility: CLINIC | Age: 30
End: 2025-03-25
Attending: OPHTHALMOLOGY
Payer: COMMERCIAL

## 2025-03-25 DIAGNOSIS — Z96.1 PSEUDOPHAKIA, BOTH EYES: ICD-10-CM

## 2025-03-25 DIAGNOSIS — H40.053 BILATERAL OCULAR HYPERTENSION: ICD-10-CM

## 2025-03-25 DIAGNOSIS — Z79.899 HIGH RISK MEDICATION USE: ICD-10-CM

## 2025-03-25 DIAGNOSIS — H30.23 INTERMEDIATE UVEITIS OF BOTH EYES: ICD-10-CM

## 2025-03-25 DIAGNOSIS — H20.13 CHRONIC ANTERIOR UVEITIS OF BOTH EYES: Primary | ICD-10-CM

## 2025-03-25 PROCEDURE — 92134 CPTRZ OPH DX IMG PST SGM RTA: CPT | Performed by: OPHTHALMOLOGY

## 2025-03-25 PROCEDURE — 92133 CPTRZD OPH DX IMG PST SGM ON: CPT | Performed by: OPHTHALMOLOGY

## 2025-03-25 PROCEDURE — 99212 OFFICE O/P EST SF 10 MIN: CPT | Performed by: OPHTHALMOLOGY

## 2025-03-25 RX ORDER — BRIMONIDINE TARTRATE 2 MG/ML
1 SOLUTION/ DROPS OPHTHALMIC 2 TIMES DAILY
Qty: 15 ML | Refills: 5 | Status: SHIPPED | OUTPATIENT
Start: 2025-03-25

## 2025-03-25 RX ORDER — ADALIMUMAB-ATTO 40 MG/.4ML
40 INJECTION SUBCUTANEOUS
COMMUNITY
Start: 2025-03-13

## 2025-03-25 RX ORDER — PREDNISOLONE ACETATE 10 MG/ML
1 SUSPENSION/ DROPS OPHTHALMIC 2 TIMES DAILY
Qty: 10 ML | Refills: 4 | Status: SHIPPED | OUTPATIENT
Start: 2025-03-25

## 2025-03-25 ASSESSMENT — SLIT LAMP EXAM - LIDS
COMMENTS: NORMAL
COMMENTS: NORMAL

## 2025-03-25 ASSESSMENT — CONF VISUAL FIELD
OS_SUPERIOR_TEMPORAL_RESTRICTION: 0
OS_INFERIOR_NASAL_RESTRICTION: 0
OD_SUPERIOR_NASAL_RESTRICTION: 0
OS_INFERIOR_TEMPORAL_RESTRICTION: 0
OD_NORMAL: 1
OD_INFERIOR_TEMPORAL_RESTRICTION: 0
OS_NORMAL: 1
OD_INFERIOR_NASAL_RESTRICTION: 0
METHOD: COUNTING FINGERS
OD_SUPERIOR_TEMPORAL_RESTRICTION: 0
OS_SUPERIOR_NASAL_RESTRICTION: 0

## 2025-03-25 ASSESSMENT — TONOMETRY
OD_IOP_MMHG: 16
OS_IOP_MMHG: 13
IOP_METHOD: TONOPEN

## 2025-03-25 ASSESSMENT — VISUAL ACUITY
OS_SC: 20/20
OD_SC: 20/20
METHOD: SNELLEN - LINEAR

## 2025-03-25 ASSESSMENT — CUP TO DISC RATIO
OS_RATIO: 0.25
OD_RATIO: 0.2

## 2025-03-25 ASSESSMENT — EXTERNAL EXAM - LEFT EYE: OS_EXAM: NORMAL

## 2025-03-25 ASSESSMENT — EXTERNAL EXAM - RIGHT EYE: OD_EXAM: NORMAL

## 2025-03-25 NOTE — NURSING NOTE
Chief Complaints and History of Present Illnesses   Patient presents with    Uveitis Follow-Up     Chief Complaint(s) and History of Present Illness(es)       Uveitis Follow-Up              Laterality: both eyes    Onset: unknown    Onset: years ago    Severity: moderate              Comments    Patient states that she di have a flare since January. She states that she did message about it and her drops were adjusted. She states that when she is on the steroid drops her eyes feel sore and tired. She has noticed some floaters. No flashes of light.     Ocular Meds:prednisolone TID in both eyes  Brimonidine BID in both eyes    Oral Meds:Amjevita 40 mg every 14 days  Methotrexate 20 mg weekly  Folic acid 2mg    Layton PEREZ, March 25, 2025 12:44 PM

## 2025-03-25 NOTE — PATIENT INSTRUCTIONS
Continue these medications:Adalimumab Biosimilar (Amjevita) 40 mg every 14 days,-2 doses so far, Oral Methotrexate 20 mg weekly, Folic acid 2mg most days  and 3mg day before and day after methotrexate  For the pressure lowering drops, continue Brimonidine 2x/day in each eye  For the prednisolone drop, continue 3x/day in each eye for the rest of this week. Then starting 3/31/25,reduce to 2x/day in each eye until each visit  NO prednisone pills needed and no other treatments.

## 2025-03-25 NOTE — LETTER
3/25/2025       RE: Glory Oswald  [address] [id number]     Dear Colleague,    Thank you for referring your patient, Glory Oswald, to the [facility] [place] at [facility]. Please see a copy of my visit note below.    Chief Complaint/Presenting Concern:  Uveitis follow up    Interval History of Present Ocular Illness:  Glory Oswald is a 29 year old patient who returns for follow up of her chronic anterior uveitis and intermediate uveitis.Last visit things were doing well and we modified drops and prednisone.    Glory noticed a flare in the left eye after prednisone was tapered and we messaged about drop modifications. At the recent visit with Dr. De Luna, he recommended continuing Brimonidine and to recheck lens capsules next time.    Glory reports things are doing fairly well with some changes as below.    Interval Updates to Medical/Family/Social History:    Glory saw Dr. Doyle last week and is now on a different biosimilar, Amjevita. Now doses of this one and the injector is different than previously but going okay. Continues methotrexate and folic acid  Work forms all completed    Relevant Review of Systems Updates:  Health doing well other than mild cold symptoms in February 2025 but now better.     Labs: No viewable ones     Current eye related medications:   Systemic: Adalimumab Biosimilar (Amjevita) 40 mg every 14 days,-2 doses so far, Oral Methotrexate 20 mg weekly, Folic acid 2mg most days  and 3mg day before and day after methotrexate, no oral prednisone for 5-6 weeks.   Drops: Brimonidine 2x/day both eyes, Prednisolone 3x/day each eye, no ketorolac    Retina/Uveitis Imaging:   OCT Spectralis Macula March 25, 2025  right eye: Normal contour, no fluid. No NFL Thickening, no pp fluid  left eye: Mild hazy media, Normal contour, no fluid. No NFL Thickening, no pp fluid    OCT Optic Nerve RNFL Spectralis  March 25, 2025  right eye: Avg thickness 119 microns, minimal thickening, no thinning  left eye: Avg thickness 117 microns, minimal thickening, no thinning    Assessment:     1. 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 Intermediate uveitis of both eyes  No active haze    3. Bilateral ocular hypertension  IOP doing great on Brimonidine    4. Pseudophakia, both eyes  With mild PCO    5. High risk medication use  Adalimumab Biosimilar (Amjevita) 40 mg every 14 days,-2 doses so far, Oral Methotrexate 20 mg weekly, Folic acid 2mg most days  and 3mg day before and day after methotrexate, no prednisone    Plan/Recommendations:    Discussed findings with patient. The eyes are doing great with only a few cells left eye, no vitreous haze, no retinal edema in either eye. We can continue the baseline medications and taper prednisolone drops.  Eye pressure is controlled on brimonidine only. The optic nerve scans are normal and we can continue this drop  Recommend additional testing when scheduled with Dr. Doyle  Continue these medications:Adalimumab Biosimilar (Amjevita) 40 mg every 14 days,-2 doses so far, Oral Methotrexate 20 mg weekly, Folic acid 2mg most days  and 3mg day before and day after methotrexate  For the pressure lowering drops, continue Brimonidine 2x/day in each eye  For the prednisolone drop, continue 3x/day in each eye for the rest of this week. Then starting 3/31/25,reduce to 2x/day in each eye until each visit  NO prednisone pills needed and no other treatments.    RTC 6/10/25 Dr. De Luna to consider YAG Capsulotomy one/both eyes. Should be just fine to do if needed with perhaps modification of prednisolone to 4x/day for 4 days then back to 2x/day    JY Late July tonopen, AC Check, then dilate, no testing            Physician Attestation    Attending Physician Attestation:  Complete documentation of historical and exam elements from today's encounter can be found in the full encounter  summary report (not reduplicated in this progress note). I personally obtained the chief complaint(s) and history of present illness. I confirmed and edited as necessary the review of systems, past medical/surgical history, family history, social history, and examination findings as documented by others; and I examined the patient myself. I personally reviewed the relevant tests, images, and reports as documented above. I formulated and edited as necessary the assessment and plan and discussed the findings and management plan with the patient and family members present at the time of this visit.  Ricky Dior M.D., Uveitis and Medical Retina, March 25, 2025     Again, thank you for allowing me to participate in the care of your patient.      Sincerely,    Ricky Dior MD  Uveitis and Medical Retina    Department of Ophthalmology & Visual Neurosciences  [facility]

## 2025-03-25 NOTE — PROGRESS NOTES
Chief Complaint/Presenting Concern:  Uveitis follow up    Interval History of Present Ocular Illness:  Glory Oswald is a 29 year old patient who returns for follow up of her chronic anterior uveitis and intermediate uveitis.Last visit things were doing well and we modified drops and prednisone.    Glory noticed a flare in the left eye after prednisone was tapered and we messaged about drop modifications. At the recent visit with Dr. De Luna, he recommended continuing Brimonidine and to recheck lens capsules next time.    Glory reports things are doing fairly well with some changes as below.    Interval Updates to Medical/Family/Social History:    Glory saw Dr. Doyle last week and is now on a different biosimilar, Amjevita. Now doses of this one and the injector is different than previously but going okay. Continues methotrexate and folic acid  Work forms all completed    Relevant Review of Systems Updates:  Health doing well other than mild cold symptoms in February 2025 but now better.     Labs: No viewable ones     Current eye related medications:   Systemic: Adalimumab Biosimilar (Amjevita) 40 mg every 14 days,-2 doses so far, Oral Methotrexate 20 mg weekly, Folic acid 2mg most days  and 3mg day before and day after methotrexate, no oral prednisone for 5-6 weeks.   Drops: Brimonidine 2x/day both eyes, Prednisolone 3x/day each eye, no ketorolac    Retina/Uveitis Imaging:   OCT Spectralis Macula March 25, 2025  right eye: Normal contour, no fluid. No NFL Thickening, no pp fluid  left eye: Mild hazy media, Normal contour, no fluid. No NFL Thickening, no pp fluid    OCT Optic Nerve RNFL Spectralis March 25, 2025  right eye: Avg thickness 119 microns, minimal thickening, no thinning  left eye: Avg thickness 117 microns, minimal thickening, no thinning    Assessment:     1. 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 Intermediate uveitis of both eyes  No active haze    3. Bilateral  ocular hypertension  IOP doing great on Brimonidine    4. Pseudophakia, both eyes  With mild PCO    5. High risk medication use  Adalimumab Biosimilar (Amjevita) 40 mg every 14 days,-2 doses so far, Oral Methotrexate 20 mg weekly, Folic acid 2mg most days  and 3mg day before and day after methotrexate, no prednisone    Plan/Recommendations:    Discussed findings with patient. The eyes are doing great with only a few cells left eye, no vitreous haze, no retinal edema in either eye. We can continue the baseline medications and taper prednisolone drops.  Eye pressure is controlled on brimonidine only. The optic nerve scans are normal and we can continue this drop  Recommend additional testing when scheduled with Dr. Doyle  Continue these medications:Adalimumab Biosimilar (Amjevita) 40 mg every 14 days,-2 doses so far, Oral Methotrexate 20 mg weekly, Folic acid 2mg most days  and 3mg day before and day after methotrexate  For the pressure lowering drops, continue Brimonidine 2x/day in each eye  For the prednisolone drop, continue 3x/day in each eye for the rest of this week. Then starting 3/31/25,reduce to 2x/day in each eye until each visit  NO prednisone pills needed and no other treatments.    RTC 6/10/25 Dr. De Luna to consider YAG Capsulotomy one/both eyes. Should be just fine to do if needed with perhaps modification of prednisolone to 4x/day for 4 days then back to 2x/day    JY Late July tonopen, AC Check, then dilate, no testing    Physician Attestation     Attending Physician Attestation:  Complete documentation of historical and exam elements from today's encounter can be found in the full encounter summary report (not reduplicated in this progress note). I personally obtained the chief complaint(s) and history of present illness. I confirmed and edited as necessary the review of systems, past medical/surgical history, family history, social history, and examination findings as documented by others; and I  examined the patient myself. I personally reviewed the relevant tests, images, and reports as documented above. I formulated and edited as necessary the assessment and plan and discussed the findings and management plan with the patient and family members present at the time of this visit.  Ricky Dior M.D., Uveitis and Medical Retina, March 25, 2025

## 2025-05-13 ENCOUNTER — MYC REFILL (OUTPATIENT)
Dept: OPHTHALMOLOGY | Facility: CLINIC | Age: 30
End: 2025-05-13
Payer: COMMERCIAL

## 2025-05-13 DIAGNOSIS — H20.13 CHRONIC ANTERIOR UVEITIS OF BOTH EYES: ICD-10-CM

## 2025-05-13 RX ORDER — PREDNISOLONE ACETATE 10 MG/ML
1 SUSPENSION/ DROPS OPHTHALMIC 2 TIMES DAILY
Qty: 10 ML | Refills: 4 | OUTPATIENT
Start: 2025-05-13

## 2025-06-10 ENCOUNTER — OFFICE VISIT (OUTPATIENT)
Dept: OPHTHALMOLOGY | Facility: CLINIC | Age: 30
End: 2025-06-10
Attending: OPHTHALMOLOGY
Payer: COMMERCIAL

## 2025-06-10 DIAGNOSIS — H30.23 INTERMEDIATE UVEITIS OF BOTH EYES: ICD-10-CM

## 2025-06-10 DIAGNOSIS — H40.053 BILATERAL OCULAR HYPERTENSION: ICD-10-CM

## 2025-06-10 DIAGNOSIS — H26.493 PCO (POSTERIOR CAPSULAR OPACIFICATION), BILATERAL: Primary | ICD-10-CM

## 2025-06-10 DIAGNOSIS — Z96.1 PSEUDOPHAKIA, BOTH EYES: ICD-10-CM

## 2025-06-10 DIAGNOSIS — H20.13 CHRONIC ANTERIOR UVEITIS OF BOTH EYES: Primary | ICD-10-CM

## 2025-06-10 DIAGNOSIS — Z79.899 HIGH RISK MEDICATION USE: ICD-10-CM

## 2025-06-10 PROCEDURE — 999N000103 HC STATISTIC NO CHARGE FACILITY FEE

## 2025-06-10 PROCEDURE — 99211 OFF/OP EST MAY X REQ PHY/QHP: CPT | Performed by: OPHTHALMOLOGY

## 2025-06-10 PROCEDURE — 99212 OFFICE O/P EST SF 10 MIN: CPT | Performed by: OPHTHALMOLOGY

## 2025-06-10 PROCEDURE — 92133 CPTRZD OPH DX IMG PST SGM ON: CPT | Performed by: OPHTHALMOLOGY

## 2025-06-10 PROCEDURE — 92083 EXTENDED VISUAL FIELD XM: CPT | Performed by: OPHTHALMOLOGY

## 2025-06-10 ASSESSMENT — TONOMETRY
OD_IOP_MMHG: 17
IOP_METHOD: TONOPEN
IOP_METHOD: TONOPEN
OD_IOP_MMHG: 17
OS_IOP_MMHG: 16
OS_IOP_MMHG: 16

## 2025-06-10 ASSESSMENT — CONF VISUAL FIELD
OD_INFERIOR_NASAL_RESTRICTION: 0
OD_NORMAL: 1
OD_SUPERIOR_TEMPORAL_RESTRICTION: 0
METHOD: COUNTING FINGERS
OD_SUPERIOR_NASAL_RESTRICTION: 0
OS_NORMAL: 1
OS_INFERIOR_TEMPORAL_RESTRICTION: 0
OS_SUPERIOR_TEMPORAL_RESTRICTION: 0
OD_INFERIOR_TEMPORAL_RESTRICTION: 0
OS_SUPERIOR_NASAL_RESTRICTION: 0
OS_INFERIOR_NASAL_RESTRICTION: 0

## 2025-06-10 ASSESSMENT — VISUAL ACUITY
OS_SC+: -2
OS_SC: 20/20
OS_SC: 20/20
OD_SC+: -3
OS_SC+: -2
OD_SC: 20/20
OD_SC: 20/20
METHOD: SNELLEN - LINEAR
OD_SC+: -3
METHOD: SNELLEN - LINEAR

## 2025-06-10 ASSESSMENT — SLIT LAMP EXAM - LIDS
COMMENTS: NORMAL

## 2025-06-10 ASSESSMENT — EXTERNAL EXAM - LEFT EYE
OS_EXAM: NORMAL
OS_EXAM: NORMAL

## 2025-06-10 ASSESSMENT — CUP TO DISC RATIO
OD_RATIO: 0.2
OS_RATIO: 0.25

## 2025-06-10 ASSESSMENT — EXTERNAL EXAM - RIGHT EYE
OD_EXAM: NORMAL
OD_EXAM: NORMAL

## 2025-06-10 NOTE — PROGRESS NOTES
HPI       Pseudophakia Follow Up    In both eyes.  Associated symptoms include blurred vision.  Pain was noted as 5/10.             Comments    Glory is here to follow up on bilateral pseudophakia, and referred back by Dr Dior to monitor PCO of both eyes. She says both eyes have been painful and blurred vision for the past couple of weeks, and not sure if it is uveitis. Also some photophobia.     Vinh Sam COT 12:02 PM Landy 10, 2025             Last edited by Vinh Sam on 6/10/2025 12:02 PM.          Review of systems for the eyes was negative other than the pertinent positives/negatives listed in the HPI.      Assessment & Plan      Glory Oswald is a 29 year old female with the following diagnoses:   1. PCO (posterior capsular opacification), bilateral    2. Pseudophakia, both eyes         Doing well  Posterior capsular opacity (PCO) both eyes   Glasses prescription updated at last visit  Artificial tears as needed    Continue Brimonidine 2x/day both eyes   Prednisolone BID OU and ketorolac (off) per JY      Patient disposition:   No follow-ups on file.       Gary Baird MD  Resident Physician, PGY-3  Department of Ophthalmology     Attending Physician Attestation:  Complete documentation of historical and exam elements from today's encounter can be found in the full encounter summary report (not reduplicated in this progress note).  I personally obtained the chief complaint(s) and history of present illness.  I confirmed and edited as necessary the review of systems, past medical/surgical history, family history, social history, and examination findings as documented by others; and I examined the patient myself.  I personally reviewed the relevant tests, images, and reports as documented above.  I formulated and edited as necessary the assessment and plan and discussed the findings and management plan with the patient and family. . - Milton De Luna MD

## 2025-06-10 NOTE — PROGRESS NOTES
Chief Complaint/Presenting Concern:  Uveitis follow up    Interval History of Present Ocular Illness:  Glory Oswald is a 29 year old patient who returns for follow up of her uveitis. Glory messaged recently about a possible flare left eye and we discussed drop modification. She saw Dr. De Luna today and they elected to monitor on drops and hold off on YAG laser capsulotomy.    Interval Updates to Medical/Family/Social History:    There have been a few issues with insurance and switches of biosimilars although now been on Amjevita for a while (late Feb-early March). There is a big red bump at times even without medicine dribbling on the skin  Work has been stressful recently    Relevant Review of Systems Updates:    Right hip pain recently  Pain to thyroid gland recently. Swallowing okay but it does hurt to touch intermittently.  Current eye related medications:     Systemic: Adalimumab Biosimilar (Amjevita) 40 mg every 14 days, Oral Methotrexate 20 mg weekly, Folic acid 2mg most days and 3mg day before and day after methotrexate.   Drops: Brimonidine 2x/day both eyes, Prednisolone back 2x/day each eye    Retina/Uveitis Imaging:   OCT Spectralis Macula Landy 10, 2025--Dr. De Luna visit  right eye: Normal contour, no fluid  left eye: Normal contour, no fluid    Assessment:     1. 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 Intermediate uveitis of both eyes  NO active haze undilated    3. Bilateral ocular hypertension  Normal on Brimonidine    4. High risk medication use  Adalimumab Biosimilar (Amjevita) 40 mg every 14 days, Oral Methotrexate 20 mg weekly    Plan/Recommendations:    Discussed findings with patient and her . There have been some symptoms and some mild residual protein flare in the eye. Even without macular edema, we will modify drops a few days but also ask Dr. Doyle about modifying systemic therapy  Eye pressure is 17,18. WE can continue Brimonidine per   Annamarie  Recommend additional testing when scheduled with Dr. Doyle  Continue Brimonidine 2x/day both eyes  For the prednisolone drop, let's increase to 3x/day today in each eye and for 2 more days and then back to 2x/day until next visit  Continue all unchanged: Adalimumab Biosimilar (Amjevita) 40 mg every 14 days, Oral Methotrexate 20 mg weekly, Folic acid 2mg most days and 3mg day before and day after methotrexate.   We will ask Dr. Doyle if insurance might coverage going back to the other biosimilar Adalimumab-Ryvk might be better or if we can could consider Infliximab infusions instead  No prednisone pills or other treatments  Let's monitor the lens implants with DR. De Luna    RTC Dr. Doyle tomorrow     Barby on 7/21 for recheck Tonopen, AC Check, then dilate, no testing    Physician Attestation     Attending Physician Attestation:  Complete documentation of historical and exam elements from today's encounter can be found in the full encounter summary report (not reduplicated in this progress note). I personally obtained the chief complaint(s) and history of present illness. I confirmed and edited as necessary the review of systems, past medical/surgical history, family history, social history, and examination findings as documented by others; and I examined the patient myself. I personally reviewed the relevant tests, images, and reports as documented above. I formulated and edited as necessary the assessment and plan and discussed the findings and management plan with the patient and family members present at the time of this visit.  Ricky Dior M.D., Uveitis and Medical Retina, Landy 10, 2025

## 2025-06-10 NOTE — PATIENT INSTRUCTIONS
Continue Brimonidine 2x/day both eyes  For the prednisolone drop, let's increase to 3x/day today in each eye and for 2 more days and then back to 2x/day until next visit  Continue all unchanged: Adalimumab Biosimilar (Amjevita) 40 mg every 14 days, Oral Methotrexate 20 mg weekly, Folic acid 2mg most days and 3mg day before and day after methotrexate.   We will ask Dr. Doyle if insurance might coverage going back to the other biosimilar Adalimumab-Ryvk might be better or if we can could consider Infliximab infusions instead  No prednisone pills or other treatments

## 2025-06-10 NOTE — LETTER
6/10/2025       RE: Glory Oswald  [address] [address]     Dear Colleague,    Thank you for referring your patient, Glory Oswald, to the [facility] [place] at [facility]. Please see a copy of my visit note below.    Chief Complaint/Presenting Concern:  Uveitis follow up    Interval History of Present Ocular Illness:  Glory Oswald is a 29 year old patient who returns for follow up of her uveitis. Glory messaged recently about a possible flare left eye and we discussed drop modification. She saw Dr. De Luna today and they elected to monitor on drops and hold off on YAG laser capsulotomy.    Interval Updates to Medical/Family/Social History:    There have been a few issues with insurance and switches of biosimilars although now been on Amjevita for a while (late Feb-early March). There is a big red bump at times even without medicine dribbling on the skin  Work has been stressful recently    Relevant Review of Systems Updates:    Right hip pain recently  Pain to thyroid gland recently. Swallowing okay but it does hurt to touch intermittently.  Current eye related medications:     Systemic: Adalimumab Biosimilar (Amjevita) 40 mg every 14 days, Oral Methotrexate 20 mg weekly, Folic acid 2mg most days and 3mg day before and day after methotrexate.   Drops: Brimonidine 2x/day both eyes, Prednisolone back 2x/day each eye    Retina/Uveitis Imaging:   OCT Spectralis Macula Landy 10, 2025--Dr. De Luna visit  right eye: Normal contour, no fluid  left eye: Normal contour, no fluid    Assessment:     1. 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 Intermediate uveitis of both eyes  NO active haze undilated    3. Bilateral ocular hypertension  Normal on Brimonidine    4. High risk medication use  Adalimumab Biosimilar (Amjevita) 40 mg every 14 days, Oral Methotrexate 20 mg weekly    Plan/Recommendations:     Discussed findings with patient and her . There have been some symptoms and some mild residual protein flare in the eye. Even without macular edema, we will modify drops a few days but also ask Dr. Doyle about modifying systemic therapy  Eye pressure is 17,18. WE can continue Brimonidine per Dr. De Luna  Recommend additional testing when scheduled with Dr. Doyle  Continue Brimonidine 2x/day both eyes  For the prednisolone drop, let's increase to 3x/day today in each eye and for 2 more days and then back to 2x/day until next visit  Continue all unchanged: Adalimumab Biosimilar (Amjevita) 40 mg every 14 days, Oral Methotrexate 20 mg weekly, Folic acid 2mg most days and 3mg day before and day after methotrexate.   We will ask Dr. Doyle if insurance might coverage going back to the other biosimilar Adalimumab-Ryvk might be better or if we can could consider Infliximab infusions instead  No prednisone pills or other treatments  Let's monitor the lens implants with DR. De Luna    RTC Dr. Doyle tomorrow     Barby on 7/21 for recheck Tonopen, AC Check, then dilate, no testing    Attending Physician Attestation:  Complete documentation of historical and exam elements from today's encounter can be found in the full encounter summary report (not reduplicated in this progress note). I personally obtained the chief complaint(s) and history of present illness. I confirmed and edited as necessary the review of systems, past medical/surgical history, family history, social history, and examination findings as documented by others; and I examined the patient myself. I personally reviewed the relevant tests, images, and reports as documented above. I formulated and edited as necessary the assessment and plan and discussed the findings and management plan with the patient and family members present at the time of this visit. Ricky Dior M.D., Uveitis and Medical Retina, Landy 10, 2025     Again, thank you for allowing me  to participate in the care of your patient.      Sincerely,    Ricky Dior MD  Uveitis and Medical Retina    Department of Ophthalmology & Visual Neurosciences  [facility]

## 2025-06-10 NOTE — PROGRESS NOTES
HPI       Pseudophakia Follow Up    In both eyes.  Associated symptoms include blurred vision.  Pain was noted as 5/10.             Comments    Glory is here to follow up on bilateral pseudophakia, and referred back by Dr Dior to monitor PCO of both eyes. She says both eyes have been painful and blurred vision for the past couple of weeks, and not sure if it is uveitis. Also some photophobia.     Vinh Sam COT 12:02 PM Landy 10, 2025             Last edited by Vinh Sam on 6/10/2025 12:02 PM.          Review of systems for the eyes was negative other than the pertinent positives/negatives listed in the HPI.      Assessment & Plan      Glory Oswald is a 29 year old female with the following diagnoses:   1. PCO (posterior capsular opacification), bilateral    2. Pseudophakia, both eyes         Here for intraocular pressure and posterior capsular opacity (PCO) recheck   Seen with JY in March and tapering prednisolone.  Currently using twice a day   Continues on amjevita and methotrexate     Fluctuating discomfort and stable blur since switching biosimilars in Feb  Right eye more bothersome today  Increased flare > cell right eye on exam today   Recommend waiting for YAG until iritis is more consistently controlled   Will see JY today to update plan    Intraocular pressure remains excellent  Continue Brimonidine 2x/day both eyes            Patient disposition: De Luna for YAG when quiescent           Attending Physician Attestation:  Complete documentation of historical and exam elements from today's encounter can be found in the full encounter summary report (not reduplicated in this progress note).  I personally obtained the chief complaint(s) and history of present illness.  I confirmed and edited as necessary the review of systems, past medical/surgical history, family history, social history, and examination findings as documented by others; and I examined the patient myself.  I personally reviewed  the relevant tests, images, and reports as documented above.  I formulated and edited as necessary the assessment and plan and discussed the findings and management plan with the patient and family. . - Milton De Luna MD

## 2025-07-21 ENCOUNTER — OFFICE VISIT (OUTPATIENT)
Dept: OPHTHALMOLOGY | Facility: CLINIC | Age: 30
End: 2025-07-21
Attending: OPHTHALMOLOGY
Payer: COMMERCIAL

## 2025-07-21 DIAGNOSIS — H20.13 CHRONIC ANTERIOR UVEITIS OF BOTH EYES: Primary | ICD-10-CM

## 2025-07-21 DIAGNOSIS — H40.053 BILATERAL OCULAR HYPERTENSION: ICD-10-CM

## 2025-07-21 DIAGNOSIS — H30.23 INTERMEDIATE UVEITIS OF BOTH EYES: ICD-10-CM

## 2025-07-21 DIAGNOSIS — Z79.899 HIGH RISK MEDICATION USE: ICD-10-CM

## 2025-07-21 PROCEDURE — 99214 OFFICE O/P EST MOD 30 MIN: CPT | Performed by: OPHTHALMOLOGY

## 2025-07-21 PROCEDURE — 99212 OFFICE O/P EST SF 10 MIN: CPT | Performed by: OPHTHALMOLOGY

## 2025-07-21 ASSESSMENT — CUP TO DISC RATIO
OD_RATIO: 0.2
OS_RATIO: 0.25

## 2025-07-21 ASSESSMENT — VISUAL ACUITY
OS_SC: 20/20
OS_SC+: -1
METHOD: SNELLEN - LINEAR
OD_SC: 20/20

## 2025-07-21 ASSESSMENT — CONF VISUAL FIELD
OD_SUPERIOR_TEMPORAL_RESTRICTION: 0
OS_INFERIOR_TEMPORAL_RESTRICTION: 0
OD_NORMAL: 1
OD_INFERIOR_NASAL_RESTRICTION: 0
OS_SUPERIOR_NASAL_RESTRICTION: 0
OD_SUPERIOR_NASAL_RESTRICTION: 0
METHOD: COUNTING FINGERS
OS_INFERIOR_NASAL_RESTRICTION: 0
OS_SUPERIOR_TEMPORAL_RESTRICTION: 0
OD_INFERIOR_TEMPORAL_RESTRICTION: 0
OS_NORMAL: 1

## 2025-07-21 ASSESSMENT — SLIT LAMP EXAM - LIDS
COMMENTS: NORMAL
COMMENTS: NORMAL

## 2025-07-21 ASSESSMENT — EXTERNAL EXAM - LEFT EYE: OS_EXAM: NORMAL

## 2025-07-21 ASSESSMENT — TONOMETRY
OS_IOP_MMHG: 15
OD_IOP_MMHG: 16
IOP_METHOD: TONOPEN

## 2025-07-21 ASSESSMENT — EXTERNAL EXAM - RIGHT EYE: OD_EXAM: NORMAL

## 2025-07-21 NOTE — PATIENT INSTRUCTIONS
Continue these medications unchanged for now:Adalimumab Biosimilar (Amjevita) 40 mg every 14 days, Sub-cutaneous 20 mg/0.8mL weekly, Folic acid 2mg most days and 3mg day before and day after methotrexate. As above, we might look into other treatments for biologics pending Dr. Doyle's office's approval   No new treatments   Continue drops unchanged: Brimonidine 2x/day both eyes, Prednisolone 2x/day each eye. If there is a day when the eyes are particularly uncomfortable, okay to do prednisolone twice more in the day.

## 2025-07-21 NOTE — LETTER
July 21, 2025      Re: Glory Oswald   1995    To Whom It May Concern:    This is to confirm that the above patient was seen on 7/21/2025.  Glory Oswald is able to return to work tomorrow. Thank you for allowing Glory Oswald to take time from work for this necessary medical appointment. Please contact us for any questions.      Thank you for your cooperation in this matter.  Please do not hesitate to contact me if you have any further questions.    Sincerely,      CODI GUEVARA

## 2025-07-21 NOTE — PROGRESS NOTES
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  We last saw each other on Landy 10, 2025 at which time we made a slight modification of drops and continued systemic therapy with the suggestion of possibly changing adalimumab biosimilar or other options.    Glory reports things are about the same with the eyes. Perhaps a bit blurry with floaters coming and going especially with weather changes. The eyes are still light sensitive at night, sunglasses do help. There is some discomfort at times, although this is intermittent and not on top of the eye and no better with drops. Various reading glasses are helpful for different tasks.     Interval Updates to Medical/Family/Social History:    Saw Dr. Doyle and there have been attempts to try different biosimilars, but only Amjevita has been covered and ongoing. Did switch to sub-cutaneous methotrexate at the same dose of 20 mg now 0.8mL weekly  Dr. Doyle thinks there may be some hip bursitis     Relevant Review of Systems Updates:  More body aches and lethargy as well as joint pain (this seems to be on Amjevita more than other ones).     Laboratory Testing: Followed by Dr. Doyle     Current eye related medications:   Systemic: Adalimumab Biosimilar (Amjevita) 40 mg every 14 days, Sub-cutaneous 20 mg/0.8mL weekly, Folic acid 2mg most days and 3mg day before and day after methotrexate.   Drops: Brimonidine 2x/day both eyes, Prednisolone 2x/day each eye    No Imaging today     Assessment:     1. 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 Intermediate uveitis of both eyes  No active haze    3. Bilateral ocular hypertension  Controlled on Brimonidine    4. High risk medication use  Adalimumab Biosimilar  (Amjevita) 40 mg every 14 days, now Sub-cutaneous 20 mg/0.8mL weekly, Folic acid 2mg most days and 3mg day before and day after methotrexate.     Plan/Recommendations:    Discussed findings with patient and her . The front of the eyes are doing well with stable low grade inflammation in the front, old floaters, but no new haze, retinal or optic disc edema. The Amjevita has kept things stable and methotrexate now sub-cutaneous been well tolerated, but given other body aches we might see if Dr. Doyle's office might try brand name Humira again, Adalimumab- ryvk, or infliximab infusions  as we have not been able to taper off steroid drops  Eye pressure is 16,15. We can continue Brimonidine per Dr. De Luna  Recommend additional testing when scheduled by Dr. Doyle.  Continue these medications unchanged for now:Adalimumab Biosimilar (Amjevita) 40 mg every 14 days, Sub-cutaneous 20 mg/0.8mL weekly, Folic acid 2mg most days and 3mg day before and day after methotrexate. As above, we might look into other treatments for biologics pending Dr. Doyle's office's approval   No new treatments   Continue drops unchanged: Brimonidine 2x/day both eyes, Prednisolone 2x/day each eye. If there is a day when the eyes are particularly uncomfortable, okay to do prednisolone twice more in the day.    RTC Early-mid October Nancy same day, plan on AC Check, dilate, OCT and RNFL (ordered)    Physician Attestation     Attending Physician Attestation:  Complete documentation of historical and exam elements from today's encounter can be found in the full encounter summary report (not reduplicated in this progress note). I personally obtained the chief complaint(s) and history of present illness. I confirmed and edited as necessary the review of systems, past medical/surgical history, family history, social history, and examination findings as documented by others; and I examined the patient myself. I personally reviewed the relevant  tests, images, and reports as documented above. I formulated and edited as necessary the assessment and plan and discussed the findings and management plan with the patient and family members present at the time of this visit.  Ricky Dior M.D., Uveitis and Medical Retina, July 21, 2025

## (undated) DEVICE — SOL WATER IRRIG 500ML BOTTLE 2F7113

## (undated) DEVICE — PACK CATARACT CUSTOM ASC SEY15CPUMC

## (undated) DEVICE — NDL BLUNT 18GA 1.5" FILTER 305211

## (undated) DEVICE — EYE SHIELD PLASTIC

## (undated) DEVICE — CATARACT BLADE PACK 2.6MM 58001899

## (undated) DEVICE — LINEN TOWEL PACK X5 5464

## (undated) DEVICE — GLOVE BIOGEL PI MICRO SZ 7.5 48575

## (undated) DEVICE — SYSTEM OMNI SURGICAL LF 1-102

## (undated) DEVICE — EYE PACK CUSTOM ANTERIOR 30DEG TIP CENTURION PPK6682-04

## (undated) DEVICE — EYE TIP IRRIGATION & ASPIRATION POLYMER CVD 0.3MM 8065751512

## (undated) RX ORDER — FENTANYL CITRATE 50 UG/ML
INJECTION, SOLUTION INTRAMUSCULAR; INTRAVENOUS
Status: DISPENSED
Start: 2025-01-20

## (undated) RX ORDER — PROPOFOL 10 MG/ML
INJECTION, EMULSION INTRAVENOUS
Status: DISPENSED
Start: 2025-01-20

## (undated) RX ORDER — ONDANSETRON 2 MG/ML
INJECTION INTRAMUSCULAR; INTRAVENOUS
Status: DISPENSED
Start: 2024-12-23

## (undated) RX ORDER — FENTANYL CITRATE 50 UG/ML
INJECTION, SOLUTION INTRAMUSCULAR; INTRAVENOUS
Status: DISPENSED
Start: 2024-12-23

## (undated) RX ORDER — ACETAMINOPHEN 325 MG/1
TABLET ORAL
Status: DISPENSED
Start: 2025-01-20

## (undated) RX ORDER — ACETAMINOPHEN 325 MG/1
TABLET ORAL
Status: DISPENSED
Start: 2024-12-23